# Patient Record
Sex: FEMALE | Race: WHITE | Employment: FULL TIME | URBAN - METROPOLITAN AREA
[De-identification: names, ages, dates, MRNs, and addresses within clinical notes are randomized per-mention and may not be internally consistent; named-entity substitution may affect disease eponyms.]

---

## 2017-03-20 RX ORDER — LEVONORGESTREL / ETHINYL ESTRADIOL AND ETHINYL ESTRADIOL 150-30(84)
KIT ORAL
Qty: 91 TABLET | Refills: 0 | Status: SHIPPED | OUTPATIENT
Start: 2017-03-20 | End: 2017-04-10 | Stop reason: SDUPTHER

## 2017-04-10 ENCOUNTER — OFFICE VISIT (OUTPATIENT)
Dept: OBGYN CLINIC | Age: 22
End: 2017-04-10

## 2017-04-10 VITALS
DIASTOLIC BLOOD PRESSURE: 80 MMHG | WEIGHT: 153 LBS | TEMPERATURE: 98.6 F | HEIGHT: 68 IN | SYSTOLIC BLOOD PRESSURE: 120 MMHG | BODY MASS INDEX: 23.19 KG/M2 | HEART RATE: 79 BPM

## 2017-04-10 DIAGNOSIS — Z79.3 ON ORAL CONTRACEPTIVE PILLS FOR NON-CONTRACEPTION INDICATION: ICD-10-CM

## 2017-04-10 DIAGNOSIS — Z01.419 WOMEN'S ANNUAL ROUTINE GYNECOLOGICAL EXAMINATION: Primary | ICD-10-CM

## 2017-04-10 DIAGNOSIS — Z87.42 HISTORY OF OVARIAN CYST: ICD-10-CM

## 2017-04-10 PROCEDURE — 99395 PREV VISIT EST AGE 18-39: CPT | Performed by: OBSTETRICS & GYNECOLOGY

## 2017-04-10 RX ORDER — LEVONORGESTREL / ETHINYL ESTRADIOL AND ETHINYL ESTRADIOL 150-30(84)
KIT ORAL
Qty: 91 TABLET | Refills: 3 | Status: SHIPPED | OUTPATIENT
Start: 2017-04-10 | End: 2019-05-06

## 2017-04-15 PROBLEM — Z79.3 ON ORAL CONTRACEPTIVE PILLS FOR NON-CONTRACEPTION INDICATION: Status: ACTIVE | Noted: 2017-04-15

## 2017-04-15 ASSESSMENT — ENCOUNTER SYMPTOMS
ABDOMINAL PAIN: 0
VOMITING: 0
NAUSEA: 0
CONSTIPATION: 0
ABDOMINAL DISTENTION: 0
DIARRHEA: 0
SHORTNESS OF BREATH: 0

## 2018-12-26 ENCOUNTER — OFFICE VISIT (OUTPATIENT)
Dept: ORTHOPEDIC SURGERY | Age: 23
End: 2018-12-26
Payer: COMMERCIAL

## 2018-12-26 VITALS
HEIGHT: 67 IN | DIASTOLIC BLOOD PRESSURE: 81 MMHG | BODY MASS INDEX: 24.33 KG/M2 | SYSTOLIC BLOOD PRESSURE: 124 MMHG | WEIGHT: 155 LBS | HEART RATE: 75 BPM

## 2018-12-26 DIAGNOSIS — M54.50 CHRONIC LUMBOSACRAL PAIN: ICD-10-CM

## 2018-12-26 DIAGNOSIS — M54.50 LUMBAR SPINE PAIN: ICD-10-CM

## 2018-12-26 DIAGNOSIS — S33.9XXA SPRAIN OF LIGAMENT OF LUMBOSACRAL JOINT, INITIAL ENCOUNTER: Primary | ICD-10-CM

## 2018-12-26 DIAGNOSIS — G89.29 CHRONIC LUMBOSACRAL PAIN: ICD-10-CM

## 2018-12-26 PROCEDURE — 99203 OFFICE O/P NEW LOW 30 MIN: CPT | Performed by: PHYSICIAN ASSISTANT

## 2018-12-26 RX ORDER — METHYLPREDNISOLONE 4 MG/1
TABLET ORAL
Qty: 1 KIT | Refills: 0 | Status: ON HOLD | OUTPATIENT
Start: 2018-12-26 | End: 2019-01-21 | Stop reason: ALTCHOICE

## 2018-12-26 RX ORDER — DICLOFENAC SODIUM 75 MG/1
75 TABLET, DELAYED RELEASE ORAL 2 TIMES DAILY
Qty: 60 TABLET | Refills: 1 | Status: SHIPPED
Start: 2018-12-26 | End: 2019-07-15 | Stop reason: ALTCHOICE

## 2019-01-10 ENCOUNTER — OFFICE VISIT (OUTPATIENT)
Dept: ORTHOPEDIC SURGERY | Age: 24
End: 2019-01-10
Payer: COMMERCIAL

## 2019-01-10 VITALS
DIASTOLIC BLOOD PRESSURE: 80 MMHG | HEART RATE: 75 BPM | SYSTOLIC BLOOD PRESSURE: 124 MMHG | HEIGHT: 67 IN | WEIGHT: 154.98 LBS | BODY MASS INDEX: 24.33 KG/M2

## 2019-01-10 DIAGNOSIS — M51.26 PROTRUSION OF LUMBAR INTERVERTEBRAL DISC: Primary | ICD-10-CM

## 2019-01-10 DIAGNOSIS — M54.16 LUMBAR RADICULITIS: ICD-10-CM

## 2019-01-10 PROCEDURE — 99213 OFFICE O/P EST LOW 20 MIN: CPT | Performed by: PHYSICIAN ASSISTANT

## 2019-01-18 ENCOUNTER — TELEPHONE (OUTPATIENT)
Dept: ORTHOPEDIC SURGERY | Age: 24
End: 2019-01-18

## 2019-01-21 ENCOUNTER — HOSPITAL ENCOUNTER (OUTPATIENT)
Age: 24
Setting detail: OUTPATIENT SURGERY
Discharge: HOME | End: 2019-01-21
Attending: PHYSICAL MEDICINE & REHABILITATION | Admitting: PHYSICAL MEDICINE & REHABILITATION
Payer: COMMERCIAL

## 2019-01-21 VITALS
SYSTOLIC BLOOD PRESSURE: 115 MMHG | TEMPERATURE: 97.5 F | DIASTOLIC BLOOD PRESSURE: 78 MMHG | HEART RATE: 51 BPM | BODY MASS INDEX: 24.33 KG/M2 | WEIGHT: 155 LBS | RESPIRATION RATE: 16 BRPM | OXYGEN SATURATION: 99 % | HEIGHT: 67 IN

## 2019-01-21 LAB — PREGNANCY, URINE: NEGATIVE

## 2019-01-21 PROCEDURE — 3600000002 HC SURGERY LEVEL 2 BASE: Performed by: PHYSICAL MEDICINE & REHABILITATION

## 2019-01-21 PROCEDURE — 7100000010 HC PHASE II RECOVERY - FIRST 15 MIN: Performed by: PHYSICAL MEDICINE & REHABILITATION

## 2019-01-21 PROCEDURE — 2709999900 HC NON-CHARGEABLE SUPPLY: Performed by: PHYSICAL MEDICINE & REHABILITATION

## 2019-01-21 PROCEDURE — 2500000003 HC RX 250 WO HCPCS: Performed by: PHYSICAL MEDICINE & REHABILITATION

## 2019-01-21 PROCEDURE — 6360000004 HC RX CONTRAST MEDICATION: Performed by: PHYSICAL MEDICINE & REHABILITATION

## 2019-01-21 PROCEDURE — 84703 CHORIONIC GONADOTROPIN ASSAY: CPT

## 2019-01-21 PROCEDURE — 6360000002 HC RX W HCPCS: Performed by: PHYSICAL MEDICINE & REHABILITATION

## 2019-01-21 RX ORDER — LIDOCAINE HYDROCHLORIDE 10 MG/ML
INJECTION, SOLUTION EPIDURAL; INFILTRATION; INTRACAUDAL; PERINEURAL PRN
Status: DISCONTINUED | OUTPATIENT
Start: 2019-01-21 | End: 2019-01-21 | Stop reason: HOSPADM

## 2019-01-21 ASSESSMENT — PAIN - FUNCTIONAL ASSESSMENT
PAIN_FUNCTIONAL_ASSESSMENT: PREVENTS OR INTERFERES WITH MANY ACTIVE NOT PASSIVE ACTIVITIES
PAIN_FUNCTIONAL_ASSESSMENT: 0-10

## 2019-01-21 ASSESSMENT — PAIN DESCRIPTION - DESCRIPTORS: DESCRIPTORS: ACHING;DULL

## 2019-02-15 ENCOUNTER — OFFICE VISIT (OUTPATIENT)
Dept: ORTHOPEDIC SURGERY | Age: 24
End: 2019-02-15
Payer: COMMERCIAL

## 2019-02-15 VITALS
BODY MASS INDEX: 24.33 KG/M2 | SYSTOLIC BLOOD PRESSURE: 124 MMHG | HEIGHT: 67 IN | DIASTOLIC BLOOD PRESSURE: 80 MMHG | HEART RATE: 77 BPM | WEIGHT: 154.98 LBS

## 2019-02-15 DIAGNOSIS — M54.16 LUMBAR RADICULITIS: ICD-10-CM

## 2019-02-15 DIAGNOSIS — M51.26 PROTRUSION OF LUMBAR INTERVERTEBRAL DISC: Primary | ICD-10-CM

## 2019-02-15 PROCEDURE — 99214 OFFICE O/P EST MOD 30 MIN: CPT | Performed by: PHYSICIAN ASSISTANT

## 2019-04-15 ENCOUNTER — TELEPHONE (OUTPATIENT)
Dept: OBGYN CLINIC | Age: 24
End: 2019-04-15

## 2019-04-15 ENCOUNTER — OFFICE VISIT (OUTPATIENT)
Dept: OBGYN CLINIC | Age: 24
End: 2019-04-15
Payer: COMMERCIAL

## 2019-04-15 VITALS
DIASTOLIC BLOOD PRESSURE: 72 MMHG | HEART RATE: 93 BPM | SYSTOLIC BLOOD PRESSURE: 116 MMHG | BODY MASS INDEX: 25.28 KG/M2 | WEIGHT: 166.8 LBS | TEMPERATURE: 98.5 F | HEIGHT: 68 IN

## 2019-04-15 DIAGNOSIS — Z87.42 HISTORY OF OVARIAN CYST: ICD-10-CM

## 2019-04-15 DIAGNOSIS — Z30.09 GENERAL COUNSELING AND ADVICE ON FEMALE CONTRACEPTION: Primary | ICD-10-CM

## 2019-04-15 DIAGNOSIS — Z79.3 ON ORAL CONTRACEPTIVE PILLS FOR NON-CONTRACEPTION INDICATION: ICD-10-CM

## 2019-04-15 PROCEDURE — 99213 OFFICE O/P EST LOW 20 MIN: CPT | Performed by: OBSTETRICS & GYNECOLOGY

## 2019-04-17 NOTE — TELEPHONE ENCOUNTER
Received benefits conformation fax. Nexplanon covered at 100%. Patient ok to move forward with order. Patient needs to re sign paperwork with full legal name in order to complete order. Patient aware that paperwork will be at . Coming by end of today or Friday.

## 2019-04-22 ENCOUNTER — OFFICE VISIT (OUTPATIENT)
Dept: ORTHOPEDIC SURGERY | Age: 24
End: 2019-04-22
Payer: COMMERCIAL

## 2019-04-22 ENCOUNTER — PRE-EVALUATION (OUTPATIENT)
Dept: ORTHOPEDIC SURGERY | Age: 24
End: 2019-04-22

## 2019-04-22 VITALS
HEART RATE: 79 BPM | HEIGHT: 68 IN | BODY MASS INDEX: 25.29 KG/M2 | WEIGHT: 166.89 LBS | DIASTOLIC BLOOD PRESSURE: 61 MMHG | SYSTOLIC BLOOD PRESSURE: 142 MMHG

## 2019-04-22 DIAGNOSIS — M51.26 HNP (HERNIATED NUCLEUS PULPOSUS), LUMBAR: ICD-10-CM

## 2019-04-22 DIAGNOSIS — M51.16 LUMBAR DISC HERNIATION WITH RADICULOPATHY: Primary | ICD-10-CM

## 2019-04-22 PROCEDURE — APPNB30 APP NON BILLABLE TIME 0-30 MINS: Performed by: PHYSICIAN ASSISTANT

## 2019-04-22 PROCEDURE — 99213 OFFICE O/P EST LOW 20 MIN: CPT | Performed by: ORTHOPAEDIC SURGERY

## 2019-04-22 ASSESSMENT — ENCOUNTER SYMPTOMS
VOMITING: 0
CONSTIPATION: 0
DIARRHEA: 0
ABDOMINAL DISTENTION: 0
ABDOMINAL PAIN: 0
NAUSEA: 0
SHORTNESS OF BREATH: 0

## 2019-04-23 NOTE — PROGRESS NOTES
Subjective:      Patient ID: Sherie Leon is a 26 y.o. female. HPI  27 y/o [de-identified] female presents to discuss OCP use and options for treatment of menstrual cycles--right lower quadrant pain. Patient is interested in switching from OCP to [facility]. Menses are every 3 months x 7 days, medium flow, occasional cramping. No sexual activity. No history of pap smear. Review of Systems   Constitutional: Negative. Negative for activity change, appetite change, chills, fatigue, fever and unexpected weight change. Respiratory: Negative for shortness of breath. Cardiovascular: Negative for chest pain. Gastrointestinal: Negative for abdominal distention, abdominal pain, constipation, diarrhea, nausea and vomiting. Genitourinary: Negative for difficulty urinating, dysuria, hematuria, menstrual problem, pelvic pain, vaginal bleeding, vaginal discharge and vaginal pain. Psychiatric/Behavioral: Negative. Objective:   Physical Exam   Constitutional: She is oriented to person, place, and time. She appears well-developed and well-nourished. No distress. Neurological: She is alert and oriented to person, place, and time. Skin: Skin is warm and dry. She is not diaphoretic. Psychiatric: She has a normal mood and affect. Her behavior is normal. Judgment and thought content normal.   Nursing note and vitals reviewed. Assessment:       Diagnosis Orders   1. General counseling and advice on female contraception     2. On oral contraceptive pills for non-contraception indication     3. History of ovarian cyst             Plan:     Approximately 15 minutes spent in counseling and coordination of care with over 50% in direct face to face counseling. Options for treatment of menstrual cycles reviewed. Risks and benefits discussed. Process for Nexplanon initiated. Follow up for Nexplanon placement. Follow up in 3-6 months for annual examination and follow up.          Blanca Arteaga, DO

## 2019-04-26 NOTE — TELEPHONE ENCOUNTER
informed pt we received Nexplanon in office. Pt to call on next day 1 of menses for insertion.     Added to med book and scanned papers

## 2019-04-28 NOTE — PROGRESS NOTES
Inform patient to schedule for a Fasting Physical with me if I'm still patient's PCP and patient wants to remain in our practice since not been seen by me for over 3 years.

## 2019-05-06 ENCOUNTER — OFFICE VISIT (OUTPATIENT)
Dept: OBGYN CLINIC | Age: 24
End: 2019-05-06
Payer: COMMERCIAL

## 2019-05-06 VITALS
BODY MASS INDEX: 24.73 KG/M2 | DIASTOLIC BLOOD PRESSURE: 68 MMHG | TEMPERATURE: 98.7 F | WEIGHT: 162.6 LBS | HEART RATE: 80 BPM | SYSTOLIC BLOOD PRESSURE: 110 MMHG

## 2019-05-06 DIAGNOSIS — Z32.02 URINE PREGNANCY TEST NEGATIVE: ICD-10-CM

## 2019-05-06 DIAGNOSIS — Z30.017 ENCOUNTER FOR INITIAL PRESCRIPTION OF IMPLANTABLE SUBDERMAL CONTRACEPTIVE: Primary | ICD-10-CM

## 2019-05-06 LAB
CONTROL: NORMAL
PREGNANCY TEST URINE, POC: NEGATIVE

## 2019-05-06 PROCEDURE — 81025 URINE PREGNANCY TEST: CPT | Performed by: OBSTETRICS & GYNECOLOGY

## 2019-05-06 PROCEDURE — 11981 INSERTION DRUG DLVR IMPLANT: CPT | Performed by: OBSTETRICS & GYNECOLOGY

## 2019-05-06 NOTE — PROGRESS NOTES
Nexplanon Contraceptive Implant   Insertion Note    The pt is a 26 y.o. Jordan Abrams who presents today for Insertion of a subdermal contraceptive implant. She has been counseled regarding the risks, benefits and alternatives of the procedure. She has signed the consent form, and wishes to proceed with insertion today. Current method of contraception: None    Desired future contraception: Nexplanon     Pregnancy test: negative      Procedure Details  The inner side of the Left arm was cleaned with Betadine and infiltrated with  1% lidocaine. The contraceptive tello was then inserted according to the 's instructions without complications. The tello was palpable under the skin after the insertion. The patient was instructed to palpate the implant. The insertion site was closed with steri-strips and coban dressing. The patient tolerated the procedure without complications.     Christy Jackman DO

## 2019-07-15 ENCOUNTER — OFFICE VISIT (OUTPATIENT)
Dept: OBGYN CLINIC | Age: 24
End: 2019-07-15
Payer: COMMERCIAL

## 2019-07-15 VITALS
TEMPERATURE: 98.4 F | HEART RATE: 55 BPM | BODY MASS INDEX: 25.25 KG/M2 | DIASTOLIC BLOOD PRESSURE: 64 MMHG | WEIGHT: 166 LBS | SYSTOLIC BLOOD PRESSURE: 112 MMHG

## 2019-07-15 DIAGNOSIS — Z30.011 ENCOUNTER FOR INITIAL PRESCRIPTION OF CONTRACEPTIVE PILLS: ICD-10-CM

## 2019-07-15 DIAGNOSIS — R10.2 PELVIC PAIN: ICD-10-CM

## 2019-07-15 DIAGNOSIS — Z30.46 ENCOUNTER FOR NEXPLANON REMOVAL: Primary | ICD-10-CM

## 2019-07-15 PROCEDURE — 99999 PR OFFICE/OUTPT VISIT,PROCEDURE ONLY: CPT | Performed by: OBSTETRICS & GYNECOLOGY

## 2019-07-15 PROCEDURE — 11982 REMOVE DRUG IMPLANT DEVICE: CPT | Performed by: OBSTETRICS & GYNECOLOGY

## 2019-07-15 RX ORDER — LEVONORGESTREL / ETHINYL ESTRADIOL AND ETHINYL ESTRADIOL 150-30(84)
1 KIT ORAL DAILY
Qty: 91 TABLET | Refills: 3 | Status: SHIPPED | OUTPATIENT
Start: 2019-07-15 | End: 2020-07-21

## 2019-07-15 NOTE — PROGRESS NOTES
Nexplanon Contraceptive Implant Removal Note    The pt is a 26 y.o. [place] Monet who presents today for removal of a subdermal contraceptive implant. She has been counseled regarding the risks, benefits and alternatives of the procedure. She has signed the consent form, and wishes to proceed with removal today. Current method of contraception: Nexplanon    Desired future contraception: undecided     Procedure Details  The inner side of the arm was cleaned with Betadine and infiltrated with 1% lidocaine. A scalpel was used to create a 1 mm incision along the distal aspect of the implant. A hemostat was used to remove the implant. Incision was then re approximated with steri-strips and coban dressing. The patient tolerated the procedure without complications.     Sanjana Ascencio,

## 2019-07-15 NOTE — PROGRESS NOTES
[facility]   Return Gyn Office Visit    CC:   Chief Complaint   Patient presents with   [facility] Other     possible wants nexplanon removed dur to making menses and cramps worse. HPI:   26 y.o. who [facility]iscuss Nex[facility]on Removal, Pt of Dr Joey Hammond. Pain on RLQ, persistent. Worsened with Nexplanon. Pain present for a couple years. OCPs were helpful, took pain down to an ache. Describes \"punching pain\" in abdomen. Last US done in November, within normal limits. States pain improves laying supine. Denies vaginal discharge / fevers / chills. Denies recent pelvic infection. Denies nausea / emesis / diarrhea or constipation. Does not admit to bowel changes affecting her pain. No visit scheduled with Dr. Joey Hammond. Review of Systems - The following ROS was otherwise negative, except as noted in the HPI: constitutional, respiratory, cardiovascular, gastrointestinal, genitourinary    Objective:  /64 (Site: Left Upper Arm, Position: Sitting, Cuff Size: Medium Adult)   Pulse 55   Temp 98.4 °F (36.9 °C) (Oral)   Wt 166 lb (75.3 kg)   LMP 07/01/2019   Breastfeeding? No   BMI 25.25 kg/m²   General: Alert, well appearing, no acute distress   Resp effort within normal limits   Abdomen: Soft, nontender, nondistended   Pelvic: deferred   Skin: No visible lesions / rashes / concerning nevus, Nexplanon in place in arm    Extremities: No redness or tenderness, neg Miguelina's sign  Osteopathic: no TART changes    Assessment/Plan  1. Encounter for Nexplanon removal    2. Encounter for initial prescription of contraceptive pills  - Levonorgest-Eth Estrad 91-Day 0.15-0.03 &0.01 MG TABS; Take 1 tablet by mouth daily  Dispense: 91 tablet; Refill: 3    3. Pelvic pain      Follow Up   Return if symptoms worsen or fail to improve.     Merlin Mejia,

## 2020-02-17 ENCOUNTER — OFFICE VISIT (OUTPATIENT)
Dept: ORTHOPEDIC SURGERY | Age: 25
End: 2020-02-17
Payer: COMMERCIAL

## 2020-02-17 VITALS — HEIGHT: 68 IN | WEIGHT: 166.01 LBS | BODY MASS INDEX: 25.16 KG/M2

## 2020-02-17 PROCEDURE — 99214 OFFICE O/P EST MOD 30 MIN: CPT | Performed by: ORTHOPAEDIC SURGERY

## 2020-02-17 RX ORDER — DICLOFENAC SODIUM 75 MG/1
75 TABLET, DELAYED RELEASE ORAL 2 TIMES DAILY
Qty: 60 TABLET | Refills: 1 | Status: SHIPPED | OUTPATIENT
Start: 2020-02-17 | End: 2020-09-22

## 2020-02-27 ENCOUNTER — HOSPITAL ENCOUNTER (OUTPATIENT)
Dept: PHYSICAL THERAPY | Age: 25
Setting detail: THERAPIES SERIES
Discharge: HOME | End: 2020-02-27
Payer: COMMERCIAL

## 2020-02-27 PROCEDURE — 97032 APPL MODALITY 1+ESTIM EA 15: CPT

## 2020-02-27 PROCEDURE — 97161 PT EVAL LOW COMPLEX 20 MIN: CPT

## 2020-02-27 PROCEDURE — 97110 THERAPEUTIC EXERCISES: CPT

## 2020-02-27 NOTE — FLOWSHEET NOTE
Glut sets 5\"x15  HEP   TA isos 5\"x15  HEP   Prone quad stretch 30\"x3  HEP                                 Pt education on current condition, POC, expectations for therapy, and HEP 5'                 Manual Intervention (57067)                                          NMR re-education (71919)   CUES NEEDED                                                         Therapeutic Activity (94850)                                                Therapeutic Exercise and NMR EXR  [x] (52195) Provided verbal/tactile cueing for activities related to strengthening, flexibility, endurance, ROM for improvements in LE, proximal hip, and core control with self care, mobility, lifting, ambulation.  [] (79379) Provided verbal/tactile cueing for activities related to improving balance, coordination, kinesthetic sense, posture, motor skill, proprioception  to assist with LE, proximal hip, and core control in self care, mobility, lifting, ambulation and eccentric single leg control.      NMR and Therapeutic Activities:    [] (55261 or 56255) Provided verbal/tactile cueing for activities related to improving balance, coordination, kinesthetic sense, posture, motor skill, proprioception and motor activation to allow for proper function of core, proximal hip and LE with self care and ADLs  [] (60571) Gait Re-education- Provided training and instruction to the patient for proper LE, core and proximal hip recruitment and positioning and eccentric body weight control with ambulation re-education including up and down stairs     Home Exercise Program:    [x] (57406) Reviewed/Progressed HEP activities related to strengthening, flexibility, endurance, ROM of core, proximal hip and LE for functional self-care, mobility, lifting and ambulation/stair navigation   [] (31281)Reviewed/Progressed HEP activities related to improving balance, coordination, kinesthetic sense, posture, motor skill, proprioception of core, proximal hip and LE for self care, indicated by patients Functional Deficits. []? Progressing: []? Met: []? Not Met: []? Adjusted  3. Patient will demonstrate an increase in Strength to good proximal hip strength and control, 5/5 R LE to allow for proper functional mobility as indicated by patients Functional Deficits. []? Progressing: []? Met: []? Not Met: []? Adjusted  4. Patient will return to all functional activities without increased symptoms or restriction. []? Progressing: []? Met: []? Not Met: []? Adjusted  5. Patient will be able to squat without R hip pain(patient specific functional goal)    []? Progressing: []? Met: []? Not Met: []? Adjusted            Progression Towards Functional goals:  [] Patient is progressing as expected towards functional goals listed. [] Progression is slowed due to complexities listed. [] Progression has been slowed due to co-morbidities. [x] Plan just implemented, too soon to assess goals progression  [] Other:         Overall Progression Towards Functional goals/ Treatment Progress Update:  [] Patient is progressing as expected towards functional goals listed. [] Progression is slowed due to complexities/Impairments listed. [] Progression has been slowed due to co-morbidities.   [x] Plan just implemented, too soon to assess goals progression <30days   [] Goals require adjustment due to lack of progress  [] Patient is not progressing as expected and requires additional follow up with physician  [] Other    Prognosis for POC: [x] Good [] Fair  [] Poor      Patient requires continued skilled intervention: [x] Yes  [] No    Treatment/Activity Tolerance:  [x] Patient able to complete treatment  [] Patient limited by fatigue  [] Patient limited by pain    [] Patient limited by other medical complications  [] Other:     ASSESSMENT: See eval. Patient was educated on current condition, POC, expectations for therapy, and HEP    Return to Play: (if applicable)   []  Stage 1: Intro to Strength   []  Stage 2: Return to Run and Strength   []  Stage 3: Return to Jump and Strength   []  Stage 4: Dynamic Strength and Agility   []  Stage 5: Sport Specific Training     []  Ready to Return to Play, Meets All Above Stages   []  Not Ready for Return to Sports   Comments:                               PLAN: See eval  [] Continue per plan of care [] Alter current plan (see comments above)  [x] Plan of care initiated [] Hold pending MD visit [] Discharge      Electronically signed by:  Olena Padron, PT, DPT [id number]    Note: If patient does not return for scheduled/ recommended follow up visits, this note will serve as a discharge from care along with most recent update on progress.

## 2020-02-27 NOTE — PLAN OF CARE
[facility], [address]  Phone: [phone]  Fax [phone]     Physical Therapy Certification    Dear Referring Practitioner: Garfield Casanova MD,    We had the pleasure of evaluating the following patient for physical therapy services at [address] [place] [address]. A summary of our findings can be found in the initial assessment below. This includes our plan of care. If you have any questions or concerns regarding these findings, please do not hesitate to contact me at the office phone number checked above. Thank you for the referral.       Physician Signature:_______________________________Date:__________________  By signing above (or electronic signature), therapists plan is approved by physician    Patient: Toñito Merino   : 1995   MRN: [id number]  Referring Physician: Referring Practitioner: Garfield Casanova MD      Evaluation Date: 2020      Medical Diagnosis Information:  Diagnosis: S76.011A Strain of the R hip flexor; M70.61 Greater trochanteric bursitis R hip   Treatment Diagnosis: M25.551 R hip pain                                         Insurance information: PT Insurance Information: BCBS        Precautions/ Contra-indications: None  Latex Allergy:  [x]NO      []YES  Preferred Language for Healthcare:   [x]English       []other:    SUBJECTIVE: Patient stated complaint: Patient reports R hip pain that has been intermittent over the last month and has been worse with prolonged sitting, squatting or standing. She went to see Dr. FLORES [facility] who diagnosed her with a R hip flexor strain and greater trochanteric bursitis. She was prescribed a steroid which she has been taking and notes it helps.       Relevant Medical History: LBP  Functional Disability Index: LEFS: 44/80    Height5'7\" Weight 162 lbs   Pain Scale: 2-6/10  Easing factors: rest, ice/heat  Provocative factors: squatting, (M05.9)  []Osteoarthritis (M19.91)   Cardiovascular conditions   []Hypertension (I10)  []Hyperlipidemia (E78.5)  []Angina pectoris (I20)  []Atherosclerosis (I70)   Musculoskeletal conditions   []Disc pathology   []Congenital spine pathologies   []Prior surgical intervention  []Osteoporosis (M81.8)  []Osteopenia (M85.8)   Endocrine conditions   []Hypothyroid (E03.9)  []Hyperthyroid Gastrointestinal conditions   []Constipation (M17.10)   Metabolic conditions   []Morbid obesity (E66.01)  []Diabetes type 1(E10.65) or 2 (E11.65)   []Neuropathy (G60.9)     Pulmonary conditions   []Asthma (J45)  []Coughing   []COPD (J44.9)   Psychological Disorders  []Anxiety (F41.9)  []Depression (F32.9)   []Other:   []Other:          Barriers to/and or personal factors that will affect rehab potential:              []Age  []Sex              []Motivation/Lack of Motivation                        []Co-Morbidities              []Cognitive Function, education/learning barriers              [x]Environmental, home barriers              [x]profession/work barriers  []past PT/medical experience  []other:  Justification: Patient is expected to rehab well with skilled PT    Falls Risk Assessment (30 days):   [x] Falls Risk assessed and no intervention required.   [] Falls Risk assessed and Patient requires intervention due to being higher risk   TUG score (>12s at risk):     [] Falls education provided, including         ASSESSMENT: Patient presents with s/s consistent with R hip flexor strain  Functional Impairments:     []Noted lumbar/proximal hip/LE joint hypomobility   [x]Decreased LE functional ROM   [x]Decreased core/proximal hip strength and neuromuscular control   [x]Decreased LE functional strength   [x]Reduced balance/proprioceptive control   []other:      Functional Activity Limitations (from functional questionnaire and intake)   [x]Reduced ability to tolerate prolonged functional positions   [x]Reduced ability or difficulty with examination of body systems using standardized tests and measures addressing any of the following: body structures and functions (impairments), activity limitations, and/or participation restrictions;:  [x] a total of 1-2 or more elements   [] a total of 3 or more elements   [] a total of 4 or more elements   [x] A clinical presentation with:  [x] stable and/or uncomplicated characteristics   [] evolving clinical presentation with changing characteristics  [] unstable and unpredictable characteristics;   [x] Clinical decision making of [x] low, [] moderate, [] high complexity using standardized patient assessment instrument and/or measurable assessment of functional outcome. [x] EVAL (LOW) 59605 (typically 20 minutes face-to-face)  [] EVAL (MOD) 14452 (typically 30 minutes face-to-face)  [] EVAL (HIGH) 39691 (typically 45 minutes face-to-face)  [] RE-EVAL       PLAN:   Frequency/Duration:  1-2 days per week for 4  Weeks:  Interventions:  [x]  Therapeutic exercise including: strength training, ROM, for Lower extremity and core   [x]  NMR activation and proprioception for LE, Glutes and Core   [x]  Manual therapy as indicated for LE, Hip and spine to include: Dry Needling/IASTM, STM, PROM, Gr I-IV mobilizations, manipulation. [x] Modalities as needed that may include: thermal agents, E-stim, Biofeedback, US, iontophoresis as indicated  [x] Patient education on joint protection, postural re-education, activity modification, progression of HEP. HEP instruction: (see scanned forms)    GOALS:  Patient stated goal: To be able to do her job without R hip pain  [] Progressing: [] Met: [] Not Met: [] Adjusted    Therapist goals for Patient:   Short Term Goals: To be achieved in: 2 weeks  1. Independent in HEP and progression per patient tolerance, in order to prevent re-injury. [] Progressing: [] Met: [] Not Met: [] Adjusted  2.  Patient will have a decrease in pain to facilitate improvement in movement, function,

## 2020-03-02 ENCOUNTER — HOSPITAL ENCOUNTER (OUTPATIENT)
Dept: PHYSICAL THERAPY | Age: 25
Setting detail: THERAPIES SERIES
Discharge: HOME | End: 2020-03-02
Payer: COMMERCIAL

## 2020-03-02 PROCEDURE — 97140 MANUAL THERAPY 1/> REGIONS: CPT

## 2020-03-02 PROCEDURE — 97110 THERAPEUTIC EXERCISES: CPT

## 2020-03-02 PROCEDURE — 97032 APPL MODALITY 1+ESTIM EA 15: CPT

## 2020-03-02 NOTE — FLOWSHEET NOTE
[facility], [address]  Phone: [phone]  Fax [phone]    Physical Therapy Treatment Note/ Progress Report:           Date:  3/2/2020    Patient Name:  Manju Benavidez    :  1995  MRN: [id number]  Restrictions/Precautions:    Medical/Treatment Diagnosis Information:  · Diagnosis: S76.011A Strain of the R hip flexor; M70.61 Greater trochanteric bursitis R hip  · Treatment Diagnosis: M25.551 R hip pain  Insurance/Certification information:  PT Insurance Information: Lashell [address] 150   Physician Information:  Referring Practitioner: Jojo Lauren MD  Has the plan of care been signed (Y/N):        []  Yes  [x]  No     Date of Patient follow up with Physician: Not yet scheduled      Is this a Progress Report:     []  Yes  [x]  No        If Yes:  Date Range for reporting period:  Beginning2020  Ending    Progress report will be due (10 Rx or 30 days whichever is less):       Recertification will be due (POC Duration  / 90 days whichever is less): 2020      Visit # Insurance Allowable Requires auth   2 75    [x]no        []yes:     Functional Scale: LEFS: 44/80    Date assessed:  2020      Therapy Diagnosis/Practice Pattern:D      Number of Comorbidities:  [x]0     []1-2    []3+    Latex Allergy:  [x]NO      []YES  Preferred Language for Healthcare:   [x]English       []other:      Pain level:  2/10     SUBJECTIVE:  Patient reports mild improvement since last appt with greater ease of performing work-related activities. She notes that the e-stim last session helped reduce her pain a lot.      OBJECTIVE: See eval   Observation:    Test measurements:         PT Practice Pattern:   Co morbidities:   Nonsurgical   INITIAL VISIT  CURRENT VISIT   PAIN       FUNCTIONAL SCALE       ROM                                               STRENGHT (MMT) RESTRICTIONS/PRECAUTIONS: None    Exercises/Interventions:     Jorge: [id number]      Therapeutic Ex (16023) Sets/sec Reps Notes/CUES   5\"x15  HEP   5\"x15  HEP   TA isos 5\"x15  HEP   Prone quad stretch 30\"x3  HEP   Bike warm up 5'     Standing HS stretch 30\"x3     Anibal test stretch  30\"x3     Supine clamshells OVL 5\"H, 2x15      Bridges add/abd OVL 3\"H, 2x15 ea      Hip flexor isometric 5\"H, 15x      TA +kegel + hip add sequential contraction 15x                              Pt education on current condition, POC, expectations for therapy, and HEP                  Manual Intervention (42674)      Hip flexor release, SL hip flexor stretch 8'                                   NMR re-education (81024)   CUES NEEDED                                                         Therapeutic Activity (78862)                                                Therapeutic Exercise and NMR EXR  [x] (46509) Provided verbal/tactile cueing for activities related to strengthening, flexibility, endurance, ROM for improvements in LE, proximal hip, and core control with self care, mobility, lifting, ambulation.  [] (27162) Provided verbal/tactile cueing for activities related to improving balance, coordination, kinesthetic sense, posture, motor skill, proprioception  to assist with LE, proximal hip, and core control in self care, mobility, lifting, ambulation and eccentric single leg control.      NMR and Therapeutic Activities:    [] (83270 or 97253) Provided verbal/tactile cueing for activities related to improving balance, coordination, kinesthetic sense, posture, motor skill, proprioception and motor activation to allow for proper function of core, proximal hip and LE with self care and ADLs  [] (25596) Gait Re-education- Provided training and instruction to the patient for proper LE, core and proximal hip recruitment and positioning and eccentric body weight control with ambulation re-education including up and down stairs improvement in movement, function, and ADLs as indicated by Functional Deficits. []? Progressing: []? Met: []? Not Met: []? Adjusted     Long Term Goals: To be achieved in: 4 weeks  1. Disability index score of 22% or less for the LEFS to assist with reaching prior level of function. []? Progressing: []? Met: []? Not Met: []? Adjusted  2. Patient will demonstrate pain free AROM to to allow for proper joint functioning as indicated by patients Functional Deficits. []? Progressing: []? Met: []? Not Met: []? Adjusted  3. Patient will demonstrate an increase in Strength to good proximal hip strength and control, 5/5 R LE to allow for proper functional mobility as indicated by patients Functional Deficits. []? Progressing: []? Met: []? Not Met: []? Adjusted  4. Patient will return to all functional activities without increased symptoms or restriction. []? Progressing: []? Met: []? Not Met: []? Adjusted  5. Patient will be able to squat without R hip pain(patient specific functional goal)    []? Progressing: []? Met: []? Not Met: []? Adjusted            Progression Towards Functional goals:  [] Patient is progressing as expected towards functional goals listed. [] Progression is slowed due to complexities listed. [] Progression has been slowed due to co-morbidities. [x] Plan just implemented, too soon to assess goals progression  [] Other:         Overall Progression Towards Functional goals/ Treatment Progress Update:  [] Patient is progressing as expected towards functional goals listed. [] Progression is slowed due to complexities/Impairments listed. [] Progression has been slowed due to co-morbidities.   [x] Plan just implemented, too soon to assess goals progression <30days   [] Goals require adjustment due to lack of progress  [] Patient is not progressing as expected and requires additional follow up with physician  [] Other    Prognosis for POC: [x] Good [] Fair  [] Poor      Patient requires continued skilled intervention: [x] Yes  [] No    Treatment/Activity Tolerance:  [x] Patient able to complete treatment  [] Patient limited by fatigue  [] Patient limited by pain    [] Patient limited by other medical complications  [] Other:     ASSESSMENT: Proximal hip and core strengthening were progressed this session. Patient has pain with active hip flexion and palpation of her proximal RF near insertion however she was able to tolerate R hip flexor isometrics without increase in pain. All other ex were well tolerated and pain free. Return to Play: (if applicable)   []  Stage 1: Intro to Strength   []  Stage 2: Return to Run and Strength   []  Stage 3: Return to Jump and Strength   []  Stage 4: Dynamic Strength and Agility   []  Stage 5: Sport Specific Training     []  Ready to Return to Play, Meets All Above Stages   []  Not Ready for Return to Sports   Comments:                               PLAN: Continue proximal hip and core strengthening as pt tolerates. [x] Continue per plan of care [] Alter current plan (see comments above)  [] Plan of care initiated [] Hold pending MD visit [] Discharge      Electronically signed by:  Dimitri Núñez PT, DPT [id number]    Note: If patient does not return for scheduled/ recommended follow up visits, this note will serve as a discharge from care along with most recent update on progress.

## 2020-03-05 ENCOUNTER — HOSPITAL ENCOUNTER (OUTPATIENT)
Dept: PHYSICAL THERAPY | Age: 25
Setting detail: THERAPIES SERIES
Discharge: HOME | End: 2020-03-05
Payer: COMMERCIAL

## 2020-03-05 PROCEDURE — 97140 MANUAL THERAPY 1/> REGIONS: CPT

## 2020-03-05 PROCEDURE — 97110 THERAPEUTIC EXERCISES: CPT

## 2020-03-05 PROCEDURE — 97032 APPL MODALITY 1+ESTIM EA 15: CPT

## 2020-03-05 NOTE — FLOWSHEET NOTE
None    Exercises/Interventions:     [facility]: [id number]      Therapeutic Ex (49337) Sets/sec Reps Notes/CUES   5\"x15  HEP   5\"x15  HEP   5\"x15  HEP   30\"x3  HEP   Bike warm up 5'     Trustretch HS stretch 30\"x3     Anibal test stretch  30\"x3      5\"H, 2x15      Bridges add/abd OVL 3\"H, 2x15 ea      Hip flexor isometric 5\"H, 15x      TA +kegel + hip add sequential contraction 15x      Buckeyes  2x10     Supine marches OVL 15x 3\"H     SLS latt pull down+ opposite hip flexion 2x10 ea     LBW GVL 15'x4     Pt education on current condition, POC, expectations for therapy, and HEP                  Manual Intervention (40954)      Hip flexor release, SL hip flexor stretch 8'     Massage stick R RF, VL,                               NMR re-education (07435)   CUES NEEDED                                                         Therapeutic Activity (05525)                                                Therapeutic Exercise and NMR EXR  [x] (84336) Provided verbal/tactile cueing for activities related to strengthening, flexibility, endurance, ROM for improvements in LE, proximal hip, and core control with self care, mobility, lifting, ambulation.  [] (83858) Provided verbal/tactile cueing for activities related to improving balance, coordination, kinesthetic sense, posture, motor skill, proprioception  to assist with LE, proximal hip, and core control in self care, mobility, lifting, ambulation and eccentric single leg control.      NMR and Therapeutic Activities:    [] (11449 or 42816) Provided verbal/tactile cueing for activities related to improving balance, coordination, kinesthetic sense, posture, motor skill, proprioception and motor activation to allow for proper function of core, proximal hip and LE with self care and ADLs  [] (22779) Gait Re-education- Provided training and instruction to the patient for proper LE, core and proximal hip recruitment and positioning and eccentric body weight control with ambulation re-education including up and down stairs     Home Exercise Program:    [x] (89800) Reviewed/Progressed HEP activities related to strengthening, flexibility, endurance, ROM of core, proximal hip and LE for functional self-care, mobility, lifting and ambulation/stair navigation   [] (34446)Reviewed/Progressed HEP activities related to improving balance, coordination, kinesthetic sense, posture, motor skill, proprioception of core, proximal hip and LE for self care, mobility, lifting, and ambulation/stair navigation      Manual Treatments:  PROM / STM / Oscillations-Mobs:  G-I, II, III, IV (PA's, Inf., Post.)  [] (07353) Provided manual therapy to mobilize LE, proximal hip and/or LS spine soft tissue/joints for the purpose of modulating pain, promoting relaxation,  increasing ROM, reducing/eliminating soft tissue swelling/inflammation/restriction, improving soft tissue extensibility and allowing for proper ROM for normal function with self care, mobility, lifting and ambulation. Modalities:  HP + PM x 15'   [] GAME READY (VASO)- for significant edema, swelling, pain control. Charges:  Timed Code Treatment Minutes: 45'    Total Treatment Minutes: 79'          [] EVAL (LOW) 51277 (typically 20 minutes face-to-face)  [] EVAL (MOD) 53450 (typically 30 minutes face-to-face)  [] EVAL (HIGH) 21160 (typically 45 minutes face-to-face)  [] RE-EVAL     [x] UE(97924) x 2    [] IONTO  [] NMR (25527) x     [] VASO  [x] Manual (85612) x 1     [] Other:  [] TA x      [] Mech Traction (57933)  [] ES(attended) (74892)      [x] ES (un) (16096): HP+PM x15'      GOALS:   Patient stated goal: To be able to do her job without R hip pain  []? Progressing: []? Met: []? Not Met: []? Adjusted     Therapist goals for Patient:   Short Term Goals: To be achieved in: 2 weeks  1. Independent in HEP and progression per patient tolerance, in order to prevent re-injury. []? Progressing: []? Met: []? Not Met: []? Adjusted  2.  Patient will have Poor      Patient requires continued skilled intervention: [x] Yes  [] No    Treatment/Activity Tolerance:  [x] Patient able to complete treatment  [] Patient limited by fatigue  [] Patient limited by pain    [] Patient limited by other medical complications  [] Other:     ASSESSMENT: Patient had pain free hip flexion this session with addition of light resistance well tolerated. WB exercises were incorporated as well with focus on core and proximal hip. Patient was able to perform all pain free and given updated HEP. Return to Play: (if applicable)   []  Stage 1: Intro to Strength   []  Stage 2: Return to Run and Strength   []  Stage 3: Return to Jump and Strength   []  Stage 4: Dynamic Strength and Agility   []  Stage 5: Sport Specific Training     []  Ready to Return to Play, Meets All Above Stages   []  Not Ready for Return to Sports   Comments:                               PLAN: Continue proximal hip and core strengthening as pt tolerates. [x] Continue per plan of care [] Alter current plan (see comments above)  [] Plan of care initiated [] Hold pending MD visit [] Discharge      Electronically signed by:  Gela Mcgee PT, DPT [id number]    Note: If patient does not return for scheduled/ recommended follow up visits, this note will serve as a discharge from care along with most recent update on progress.

## 2020-03-09 ENCOUNTER — HOSPITAL ENCOUNTER (OUTPATIENT)
Dept: PHYSICAL THERAPY | Age: 25
Setting detail: THERAPIES SERIES
Discharge: HOME | End: 2020-03-09
Payer: COMMERCIAL

## 2020-03-09 PROCEDURE — 97110 THERAPEUTIC EXERCISES: CPT | Performed by: PHYSICAL THERAPIST

## 2020-03-09 PROCEDURE — 97140 MANUAL THERAPY 1/> REGIONS: CPT | Performed by: PHYSICAL THERAPIST

## 2020-03-09 PROCEDURE — G0283 ELEC STIM OTHER THAN WOUND: HCPCS | Performed by: PHYSICAL THERAPIST

## 2020-03-09 NOTE — FLOWSHEET NOTE
[facility], [address] [place]  Phone: [phone]  Fax [phone]      ATHLETIC TRAINING [address]  Date:  3/9/2020    Patient Name:  Merari Pritchard    :  1995  MRN: [id number]  Restrictions/Precautions:    Medical/Treatment Diagnosis Information:  ·   Diagnosis: S76.011A Strain of the R hip flexor; M70.61 Greater trochanteric bursitis R hip  · Treatment Diagnosis: M25.551 R hip pain  Physician Information:    You Jaimes MD    Weeks Post-op  8 wks  12 wks 16 wks 20 wks   24 wks                            Activity Log                                                  DOS/DOI:                                                    Date:  20   ATC communication    Bike    Elliptical    Treadmill    Airdyne        Gastroc stretch    Soleus stretch    Hamstring stretch    ITB stretch    Hip Flexor stretch    Quad stretch    Adductor stretch        Weight Shifting sp                              fp                              tp    Lateral walking (with/w/o TB)        Balance: PEP/Rosi board                   SLS          Star excursion load/explode          Extremity reach UE/LE        Leg Press Marlon. Ecc.                      Inv. Calf Press Marlon. Ecc.                        Inv.        ISTAU   Flex               ABd 45# R/L 30x              ADd Red Band Ecc Walkout R/L 12x             TKE 75# 30x5\" /march              Ext 60# R/L 30x       Steps Up               Up and Over               Down               Lateral               Rotation        Squats  mini                  wall                 BOSU         Lunges:  Lunge to Balance                   Balance to Lunge                   Walking        Knee Extension Bilat. Ecc.                               Inv. Hamstring Curls Bilat.

## 2020-03-09 NOTE — FLOWSHEET NOTE
RESTRICTIONS/PRECAUTIONS: None    Exercises/Interventions:     [facility]: [id number]      Therapeutic Ex (11569) (23') Sets/sec Reps Notes/CUES   5\"x15  HEP   5\"x15  HEP   5\"x15  HEP   30\"x3  HEP   Bike warm up 5' L2 post mobs and manuals prior to therex     Trustretch HS stretch 3D  30\"x3     Anibal test stretch  30\"x3      5\"H, 2x15      Bridges abd OVL 3\"H, 2x15 ea              Buckeyes RLE only EXT/L active FLX 2x10x5\"                 HL ADD sets 10x10\" withTA           Open book stretch with ADD ball at knees R/L 10x3\" ea                 Pt education x6'  Process, mechanics, post/ant hip relationship   AT See AT note  B TKE/HAB/Glute         Manual Intervention (72060) (15')      Hip flexor release (RF release, iliacus release), prone sacral base mobs GIII, glute and TFl release RLE, prone hip PROM and quad stretch 12'           prone HF stretch R/LLE 3'                       NMR re-education (19739)   CUES NEEDED                                                         Therapeutic Activity (04153)                                                Therapeutic Exercise and NMR EXR  [x] (58889) Provided verbal/tactile cueing for activities related to strengthening, flexibility, endurance, ROM for improvements in LE, proximal hip, and core control with self care, mobility, lifting, ambulation.  [] (58957) Provided verbal/tactile cueing for activities related to improving balance, coordination, kinesthetic sense, posture, motor skill, proprioception  to assist with LE, proximal hip, and core control in self care, mobility, lifting, ambulation and eccentric single leg control.      NMR and Therapeutic Activities:    [] (59664 or 57554) Provided verbal/tactile cueing for activities related to improving balance, coordination, kinesthetic sense, posture, motor skill, proprioception and motor activation to allow for proper function of core, proximal hip and LE with self care and ADLs  [] (57536) Gait Re-education- Provided training and instruction to the patient for proper LE, core and proximal hip recruitment and positioning and eccentric body weight control with ambulation re-education including up and down stairs     Home Exercise Program:    [x] (66691) Reviewed/Progressed HEP activities related to strengthening, flexibility, endurance, ROM of core, proximal hip and LE for functional self-care, mobility, lifting and ambulation/stair navigation   [] (44995)Reviewed/Progressed HEP activities related to improving balance, coordination, kinesthetic sense, posture, motor skill, proprioception of core, proximal hip and LE for self care, mobility, lifting, and ambulation/stair navigation      Manual Treatments:  PROM / STM / Oscillations-Mobs:  G-I, II, III, IV (PA's, Inf., Post.)  [x] (94365) Provided manual therapy to mobilize LE, proximal hip and/or LS spine soft tissue/joints for the purpose of modulating pain, promoting relaxation,  increasing ROM, reducing/eliminating soft tissue swelling/inflammation/restriction, improving soft tissue extensibility and allowing for proper ROM for normal function with self care, mobility, lifting and ambulation. Modalities:  HP + PM x 15'   [] GAME READY (VASO)- for significant edema, swelling, pain control. Charges:  Timed Code Treatment Minutes: 45'   Total Treatment Minutes: 48'         [] EVAL (LOW) 18045 (typically 20 minutes face-to-face)  [] EVAL (MOD) 92088 (typically 30 minutes face-to-face)  [] EVAL (HIGH) 50136 (typically 45 minutes face-to-face)  [] RE-EVAL     [x] EW(19958) x 2    [] IONTO  [] NMR (58628) x     [] VASO  [x] Manual (91714) x 1     [] Other:  [] TA x      [] Mech Traction (84345)  [] ES(attended) (98486)      [x] ES (un) (90736): HP+PM x15'      GOALS:   Patient stated goal: To be able to do her job without R hip pain  []? Progressing: []? Met: []? Not Met: []? Adjusted     Therapist goals for Patient:   Short Term Goals:  To be achieved in:

## 2020-03-12 ENCOUNTER — HOSPITAL ENCOUNTER (OUTPATIENT)
Dept: PHYSICAL THERAPY | Age: 25
Setting detail: THERAPIES SERIES
Discharge: HOME | End: 2020-03-12
Payer: COMMERCIAL

## 2020-03-12 PROCEDURE — 97110 THERAPEUTIC EXERCISES: CPT

## 2020-03-12 NOTE — FLOWSHEET NOTE
STRENGHT (MMT)                                                             RESTRICTIONS/PRECAUTIONS: None    Exercises/Interventions:     [facility]: [id number]      Therapeutic Ex (52322) (23') Sets/sec Reps Notes/CUES   5\"x15  HEP   5\"x15  HEP   5\"x15  HEP   30\"x3  HEP   Bike warm up 5' L2 post mobs and manuals prior to therex     Trustretch HS stretch 3D  30\"x3     Anibal test stretch  30\"x3      5\"H, 2x15      Bridges abd GVL 3\"H, 2x15 ea               2x10x5\"             LBW GVL 15'x4     10x10\" withTA     SB squats 2x15      SB bridges 2x15                   10x3\" ea                 Pt education x6'  Process, mechanics, post/ant hip relationship   AT See AT note  B TKE/HAB/Glute         Manual Intervention (49673) (15')      Hip flexor release (RF release, iliacus release), 3'                                  NMR re-education (36484)   CUES NEEDED                                                         Therapeutic Activity (24347)                                                Therapeutic Exercise and NMR EXR  [x] (65255) Provided verbal/tactile cueing for activities related to strengthening, flexibility, endurance, ROM for improvements in LE, proximal hip, and core control with self care, mobility, lifting, ambulation.  [] (59280) Provided verbal/tactile cueing for activities related to improving balance, coordination, kinesthetic sense, posture, motor skill, proprioception  to assist with LE, proximal hip, and core control in self care, mobility, lifting, ambulation and eccentric single leg control.      NMR and Therapeutic Activities:    [] (86664 or 17100) Provided verbal/tactile cueing for activities related to improving balance, coordination, kinesthetic sense, posture, motor skill, proprioception and motor activation to allow for proper function of core, proximal hip and LE with self care and ADLs  [] (96630) Gait Re-education- Provided training and instruction to the patient for

## 2020-03-12 NOTE — FLOWSHEET NOTE
2x10                  Balance to Lunge                    Walking          Knee Extension Bilat. Ecc.                                Inv. Hamstring Curls Bilat. NV                              Ecc.                                Inv.          Soleus Press Bilat. Ecc.                            Inv.                                Ladders                 Square                Jump/Hop  Low                       Med.                       High                                                               Modality PM/MHP 15' PM/MHP 15'   Initials                             EP DTM   Time spent one on one (workers comp)     Time spent with PT assistant

## 2020-03-16 ENCOUNTER — HOSPITAL ENCOUNTER (OUTPATIENT)
Dept: PHYSICAL THERAPY | Age: 25
Setting detail: THERAPIES SERIES
End: 2020-03-16
Payer: COMMERCIAL

## 2020-03-19 ENCOUNTER — HOSPITAL ENCOUNTER (OUTPATIENT)
Dept: PHYSICAL THERAPY | Age: 25
Setting detail: THERAPIES SERIES
Discharge: HOME | End: 2020-03-19
Payer: COMMERCIAL

## 2020-03-23 ENCOUNTER — APPOINTMENT (OUTPATIENT)
Dept: PHYSICAL THERAPY | Age: 25
End: 2020-03-23
Payer: COMMERCIAL

## 2020-03-26 ENCOUNTER — APPOINTMENT (OUTPATIENT)
Dept: PHYSICAL THERAPY | Age: 25
End: 2020-03-26
Payer: COMMERCIAL

## 2020-07-22 RX ORDER — LEVONORGESTREL AND ETHINYL ESTRADIOL 150-30(84)
KIT ORAL
Qty: 91 TABLET | Refills: 0 | Status: SHIPPED | OUTPATIENT
Start: 2020-07-22 | End: 2021-02-09 | Stop reason: SDUPTHER

## 2020-08-11 ENCOUNTER — TELEPHONE (OUTPATIENT)
Dept: OBGYN CLINIC | Age: 25
End: 2020-08-11

## 2020-08-12 ENCOUNTER — OFFICE VISIT (OUTPATIENT)
Dept: OBGYN CLINIC | Age: 25
End: 2020-08-12
Payer: COMMERCIAL

## 2020-08-12 VITALS
TEMPERATURE: 99.9 F | SYSTOLIC BLOOD PRESSURE: 128 MMHG | DIASTOLIC BLOOD PRESSURE: 78 MMHG | WEIGHT: 159.6 LBS | BODY MASS INDEX: 24.27 KG/M2 | HEART RATE: 79 BPM

## 2020-08-12 PROCEDURE — 99385 PREV VISIT NEW AGE 18-39: CPT | Performed by: OBSTETRICS & GYNECOLOGY

## 2020-08-12 PROCEDURE — 76856 US EXAM PELVIC COMPLETE: CPT | Performed by: OBSTETRICS & GYNECOLOGY

## 2020-08-12 NOTE — PROGRESS NOTES
[facility]   Annual Exam        CC:   Chief Complaint   Patient presents with    Medication Refill     birth control pill       HPI:  22 y.o. Elsie Prather presents for her gynecologic annual exam.     She does admit to pelvic pain -- states its more on the right side, comes and goes without exacerbating factors (except sometimes when she goes from seated to standing position). Started around June. Describes pain as \"pressure\", will last for a few days then resolve. No issues with AUB bleeding associated with pain. Denies vaginal discharge / pain / dysuria / back pain. Denies F / C / SOB / CP. Health Maintenance:  Safe Enviroment: y  Healthy diet: y  Exercise: y  Sexually Active: y   Denies sexual problems  Contraception: Continuous OCP (cycle every 3 months) -- happy with BC     Screening:  Last pap smear: NA   History of abnormal pap smears: NA  STI History: Denies      Vaccines:  Gardasil vaccine: Y 2014-15   Flu vaccine:      Review of Systems:   Constitutional: Negative for appetite change, chills and fever. HENT: Negative for congestion, sneezing and sore throat. Eyes: Negative for visual disturbance. Respiratory: Negative for chest tightness, shortness of breath and wheezing. Cardiovascular: Negative for chest pain, palpitations and leg swelling. Gastrointestinal: Negative for abdominal pain, diarrhea, nausea and vomiting. Endocrine: Negative for heat intolerance. Genitourinary: Negative for difficulty urinating, dyspareunia, dysuria, + Pelvic Pain   Musculoskeletal: Negative for back pain, neck pain and neck stiffness. Skin: Negative for color change, pallor and rash. Allergic/Immunologic: Negative for environmental allergies, food allergies and immunocompromised state. Neurological: Negative for light-headedness, numbness and headaches. Hematological: Does not bruise/bleed easily. Psychiatric/Behavioral: Negative for behavioral problems, sleep disturbance and suicidal ideas. Primary Care Physician: AYAAN KERNS MD    Obstetric History  OB History    Para Term  AB Living   0 0 0 0 0 0   SAB TAB Ectopic Molar Multiple Live Births   0 0 0   0         Gynecologic History  Menstrual History:   LMP: Patient's last menstrual period was 2020 (approximate).     Menstrual Period: regular   Interval Between Menses: q 3 months   o Cont OCPs    Duration of Menses: 4-5d    Menstrual Flow: normal    Bleeding between menses: denies   Pain - Denies     MedicalHistory:  Past Medical History:   Diagnosis Date    Ovarian cyst, right     Seasonal allergies        Medications:  Current Outpatient Medications   Medication Sig Dispense Refill    DAYSEE 0.15-0.03 &0.01 MG TABS TAKE ONE TABLET BY MOUTH DAILY 91 tablet 0    diclofenac (VOLTAREN) 75 MG EC tablet Take 1 tablet by mouth 2 times daily (Patient not taking: Reported on 2020) 60 tablet 1     Current Facility-Administered Medications   Medication Dose Route Frequency Provider Last Rate Last Dose    etonogestrel (NEXPLANON) implant 68 mg  68 mg Subdermal Once [employer], DO   68 mg at 19 1704       Surgical History:  Past Surgical History:   Procedure Laterality Date    EPIDURAL STEROID INJECTION Right 2019    RIGHT LUMBAR FIVE SACRAL ONE EPIDURAL STEROID INJECTION SITE CONFIRMED BY FLUOROSCOPY performed by En Goldman MD at [facility]       Allergies:  No Known Allergies    Family History:  Family History   Problem Relation Age of Onset    Allergies Mother     Other Mother         endometriosis    Crohn's Disease Mother     Arthritis Mother     Cancer Maternal Grandmother 59        breast cancer     Thyroid Disease Maternal Grandfather     Heart Disease Maternal Grandfather 62    High Blood Pressure Maternal Grandfather     High Cholesterol Paternal Grandmother     High Blood Pressure Paternal Grandmother     High Cholesterol Paternal Grandfather     High Blood Pressure Paternal Grandfather     High Blood Pressure Father        (+) personal/family history of cervical, uterine, ovarian, vulvar, breast, or colon cancers. Social History:  Social History     Socioeconomic History    Marital status: Single     Spouse name: None    Number of children: None    Years of education: None    Highest education level: None   Occupational History    Occupation: student   Social Needs    Financial resource strain: None    Food insecurity     Worry: None     Inability: None    Transportation needs     Medical: None     Non-medical: None   Tobacco Use    Smoking status: Never Smoker    Smokeless tobacco: Never Used   Substance and Sexual Activity    Alcohol use: No    Drug use: No    Sexual activity: Never   Lifestyle    Physical activity     Days per week: None     Minutes per session: None    Stress: None   Relationships    Social connections     Talks on phone: None     Gets together: None     Attends Voodoo service: None     Active member of club or organization: None     Attends meetings of clubs or organizations: None     Relationship status: None    Intimate partner violence     Fear of current or ex partner: None     Emotionally abused: None     Physically abused: None     Forced sexual activity: None   Other Topics Concern    None   Social History Narrative    None       Objective: Body mass index is 24.27 kg/m².   /78 (Site: Left Upper Arm, Position: Sitting, Cuff Size: Medium Adult)   Pulse 79   Temp 99.9 °F (37.7 °C) (Oral)   Wt 159 lb 9.6 oz (72.4 kg)   LMP 07/13/2020 (Approximate)   BMI 24.27 kg/m²     Exam:   General: Alert, well appearing, no acute distress  Head: Normocephalic, atraumatic  Mouth: Mucous membranes moist, pharynx normal without lesions  Thyroid: No thyromegaly or masses present   Breasts: Symmetric, non-tender to palpation, no skin changes, palpable axillary lymph nodes or masses noted  Lungs: Respiratory effort within normal limits   CV: Regular rate   Abdomen: Soft, nontender, nondistended   Pelvic:   External: Normal appearing genitalia without masses, tenderness or lesions  Vagina: moist, pink mucosa, without abnormal discharge or lesions  Cervix: no masses or lesions visualized, no cervical motion tenderness  Uterus: mobile, midline, no masses palpated  Adnexa: mobile, non-tender to palpation, without masses  Rectovaginal: deferred  Extremities: No redness or tenderness, neg Miguelina's sign  Skin: Well perfused, normal coloration and turgor, no lesions or rashes visualized  Neuro: Alert, oriented, normal speech, no focal deficits, moves extremities appropriately  Osteopathic: No TART changes    Images:  PELVIC ULTRASOUND without DOPPLER INTERROGATION   NON OB    DATE: 8/12/20    PHYSICIAN: TOBIN Brady.     SONOGRAPHER: Rodri Sutton RDMS    INDICATION: pelvic pain    TYPE OF SCAN: abdominal    FINDINGS:    The cul de sac is normal. No free fluid appreciated. The cervix is normal and not enlarged. Nabothian cyst/s is not noted within the uterine cervix. The uterus measures 6.26 cm x 4.78 cm x 3.57 cm. The uterus is anteverted. The endometrium measures 9.56 mm. The myometrium is homogeneous in appearance. No uterine anomalies are noted. The right ovary is present and normal.  The right ovary measures 1.29 cm x 1.81 cm x 1.64 cm. Ovary findings: No masses seen. The right adnexa is normal.    The left ovary is present and normal.  The left ovary measures 1.53 cm x 2.74 cm x 1.59 cm. Ovary findings: No masses seen. The left adnexa is normal.    IMPRESSION: Normal uterus. Normal right ovary. Normal left ovary. Assessment/Plan:  22 y.o. Yoli Barnett presenting for her annual exam:     Diagnosis Orders   1. Women's annual routine gynecological examination     2. Pap smear for cervical cancer screening  PAP SMEAR   3. Screening breast examination     4. Encounter for surveillance of contraceptive pills     5.  Pelvic pain       - reviewed US report -- normal anatomy, pain suggestive of ovarian cysts, none noted on exam today   - return precautions reviewed   - continue OCPS as prescribed -- call if refill needed    Annual Visit Recommendations:   Clinical breast exam was done and self-breast exam and self-breast awareness reviewed. Pelvic exam was done and ASCCP guidelines reviewed   Reviewed healthy diet and daily multivitamin use. Reviewed recommendations on Calcium and Vitamin D intake. Discussed seatbelt use. Follow Up  - Will call patient with results   -Return in about 1 year (around 8/12/2021) for Annual or sooner if needed.      (FYI: this is a patient of Dr. Divine Geiger if she needs scheduled)     Kimberley Suarez,

## 2020-09-22 ENCOUNTER — APPOINTMENT (OUTPATIENT)
Dept: GENERAL RADIOLOGY | Age: 25
End: 2020-09-22
Payer: COMMERCIAL

## 2020-09-22 ENCOUNTER — HOSPITAL ENCOUNTER (EMERGENCY)
Age: 25
Discharge: HOME | End: 2020-09-22
Payer: COMMERCIAL

## 2020-09-22 VITALS
HEART RATE: 77 BPM | BODY MASS INDEX: 25.11 KG/M2 | WEIGHT: 160 LBS | HEIGHT: 67 IN | DIASTOLIC BLOOD PRESSURE: 63 MMHG | OXYGEN SATURATION: 98 % | TEMPERATURE: 99 F | SYSTOLIC BLOOD PRESSURE: 113 MMHG | RESPIRATION RATE: 16 BRPM

## 2020-09-22 LAB
A/G RATIO: 1.7 (ref 1.1–2.2)
ALBUMIN SERPL-MCNC: 4.7 G/DL (ref 3.4–5)
ALP BLD-CCNC: 50 U/L (ref 40–129)
ALT SERPL-CCNC: 9 U/L (ref 10–40)
ANION GAP SERPL CALCULATED.3IONS-SCNC: 12 MMOL/L (ref 3–16)
AST SERPL-CCNC: 17 U/L (ref 15–37)
BASOPHILS ABSOLUTE: 0 K/UL (ref 0–0.2)
BASOPHILS RELATIVE PERCENT: 1.1 %
BILIRUB SERPL-MCNC: 0.7 MG/DL (ref 0–1)
BUN BLDV-MCNC: 8 MG/DL (ref 7–20)
CALCIUM SERPL-MCNC: 9.2 MG/DL (ref 8.3–10.6)
CHLORIDE BLD-SCNC: 102 MMOL/L (ref 99–110)
CO2: 23 MMOL/L (ref 21–32)
CREAT SERPL-MCNC: 0.8 MG/DL (ref 0.6–1.1)
D DIMER: <200 NG/ML DDU (ref 0–229)
EKG ATRIAL RATE: 70 BPM
EKG DIAGNOSIS: NORMAL
EKG P AXIS: 60 DEGREES
EKG P-R INTERVAL: 126 MS
EKG Q-T INTERVAL: 392 MS
EKG QRS DURATION: 82 MS
EKG QTC CALCULATION (BAZETT): 423 MS
EKG R AXIS: 65 DEGREES
EKG T AXIS: 32 DEGREES
EKG VENTRICULAR RATE: 70 BPM
EOSINOPHILS ABSOLUTE: 0 K/UL (ref 0–0.6)
EOSINOPHILS RELATIVE PERCENT: 1 %
GFR AFRICAN AMERICAN: >60
GFR NON-AFRICAN AMERICAN: >60
GLOBULIN: 2.7 G/DL
GLUCOSE BLD-MCNC: 115 MG/DL (ref 70–99)
HCG QUALITATIVE: NEGATIVE
HCT VFR BLD CALC: 37.9 % (ref 36–48)
HEMOGLOBIN: 12.9 G/DL (ref 12–16)
LYMPHOCYTES ABSOLUTE: 0.7 K/UL (ref 1–5.1)
LYMPHOCYTES RELATIVE PERCENT: 15.5 %
MCH RBC QN AUTO: 30.3 PG (ref 26–34)
MCHC RBC AUTO-ENTMCNC: 34.2 G/DL (ref 31–36)
MCV RBC AUTO: 88.6 FL (ref 80–100)
MONOCYTES ABSOLUTE: 0.3 K/UL (ref 0–1.3)
MONOCYTES RELATIVE PERCENT: 7.1 %
NEUTROPHILS ABSOLUTE: 3.2 K/UL (ref 1.7–7.7)
NEUTROPHILS RELATIVE PERCENT: 75.3 %
PDW BLD-RTO: 12.3 % (ref 12.4–15.4)
PLATELET # BLD: 205 K/UL (ref 135–450)
PMV BLD AUTO: 9.8 FL (ref 5–10.5)
POTASSIUM SERPL-SCNC: 4 MMOL/L (ref 3.5–5.1)
RBC # BLD: 4.27 M/UL (ref 4–5.2)
SODIUM BLD-SCNC: 137 MMOL/L (ref 136–145)
TOTAL PROTEIN: 7.4 G/DL (ref 6.4–8.2)
TROPONIN: <0.01 NG/ML
WBC # BLD: 4.2 K/UL (ref 4–11)

## 2020-09-22 PROCEDURE — 99285 EMERGENCY DEPT VISIT HI MDM: CPT

## 2020-09-22 PROCEDURE — 93010 ELECTROCARDIOGRAM REPORT: CPT | Performed by: INTERNAL MEDICINE

## 2020-09-22 PROCEDURE — 84703 CHORIONIC GONADOTROPIN ASSAY: CPT

## 2020-09-22 PROCEDURE — 85379 FIBRIN DEGRADATION QUANT: CPT

## 2020-09-22 PROCEDURE — 85025 COMPLETE CBC W/AUTO DIFF WBC: CPT

## 2020-09-22 PROCEDURE — 80053 COMPREHEN METABOLIC PANEL: CPT

## 2020-09-22 PROCEDURE — 93005 ELECTROCARDIOGRAM TRACING: CPT | Performed by: EMERGENCY MEDICINE

## 2020-09-22 PROCEDURE — 36415 COLL VENOUS BLD VENIPUNCTURE: CPT

## 2020-09-22 PROCEDURE — 71045 X-RAY EXAM CHEST 1 VIEW: CPT

## 2020-09-22 PROCEDURE — 84484 ASSAY OF TROPONIN QUANT: CPT

## 2020-09-22 ASSESSMENT — PAIN DESCRIPTION - LOCATION
LOCATION: CHEST
LOCATION: CHEST

## 2020-09-22 ASSESSMENT — PAIN DESCRIPTION - PAIN TYPE
TYPE: ACUTE PAIN
TYPE: ACUTE PAIN

## 2020-09-22 ASSESSMENT — PAIN SCALES - GENERAL
PAINLEVEL_OUTOF10: 3
PAINLEVEL_OUTOF10: 3
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
## 2020-09-30 NOTE — PROGRESS NOTES
[address] Office consultation  Note  10/1/2020   Ref by Oliverio Huerta  Subjective:  Ms. Prateek Esposito is a 22 y.o. female who presents for evaluation of fluttering in chest  and palpitations. HPI: Rosalio Artis presented to the ER on 9/22/2020 with a sudden onset of fluttering  and rapid heart beat. She reported associated shortness of breath. She was woken up with it. She then passed out. She continued to feel rapid heart beat and was taken to ED. Her EKG at that time showed sinus rhythm rate 70. Her CXR at that time was normal. Symptoms started one year ago and have become more frequent and lasting longer. She denies any chest pain. Today she reports that for the last year, she has been feeling her heart fluttering and racing. when this occurs, she feels associated shortness of breath and diaphoresis. These symptoms occur every day and can come on both at rest and on exertion. These episodes are lasting long periods of time, up to 20 minutes. She states that last Tuesday, she woke up feeling her heart racing. She then walked down her stairs and blacked out. She then proceeded to the ER. The night prior, she denies any use of a stimulant of any kind. Review of Systems:  12 point ROS negative in all areas as listed below except as in [place]  Constitutional, EENT, Cardiovascular, pulmonary, GI, , Musculoskeletal, skin, neurological, hematological, endocrine, Psychiatric    Reviewed past medical history, social, and family history. 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  She does not smoke or drink excessive alcohol  She takes birth control pills and has regular three month cycle.   Mother: afib, diagnosed age 50  Father: no heart disease  No family history of CAD  Past Medical History:   Diagnosis Date    Ovarian cyst, right     Seasonal allergies      Past Surgical History:   Procedure Laterality Date    EPIDURAL STEROID INJECTION Right 1/21/2019    RIGHT LUMBAR FIVE SACRAL ONE EPIDURAL STEROID INJECTION SITE CONFIRMED BY FLUOROSCOPY performed by Clari Rivas MD at [place] [address]       Objective:   /66   Pulse 100   Wt 160 lb 8 oz (72.8 kg)   LMP 09/22/2020   SpO2 98%   BMI 25.14 kg/m²     Wt Readings from Last 3 Encounters:   10/01/20 160 lb 8 oz (72.8 kg)   09/22/20 160 lb (72.6 kg)   08/12/20 159 lb 9.6 oz (72.4 kg)       Physical Exam:  General: No Respiratory distress, appears well developed and well nourished. Eyes:  Sclera nonicteric  Nose/Sinuses:  negative findings: nose shows no deformity, asymmetry, or inflammation, nasal mucosa normal, septum midline with no perforation or bleeding  Back:  no pain to palpation  Joint:  no active joint inflammation  Musculoskeletal:  negative  Skin:  Warm and dry  Neck:  Negative for JVD and Carotid Bruits. Chest:  Clear to auscultation, respiration easy  Cardiovascular:  RRR, S1S2 normal, 1/6 systolic murmur in the mitral area, no rub or thrill. Abdomen:  Soft normal liver and spleen  Extremities:   No edema, clubbing, cyanosis,  Pulses:  pedal pulses are normal.  Neuro: intact    Medications:   Outpatient Encounter Medications as of 10/1/2020   Medication Sig Dispense Refill    DAYSEE 0.15-0.03 &0.01 MG TABS TAKE ONE TABLET BY MOUTH DAILY 91 tablet 0     Facility-Administered Encounter Medications as of 10/1/2020   Medication Dose Route Frequency Provider Last Rate Last Dose    etonogestrel (NEXPLANON) implant 68 mg  68 mg Subdermal Once [employer], DO   68 mg at 05/06/19 1704        Lab Data:  CBC: No results for input(s): WBC, HGB, HCT, MCV, PLT in the last 72 hours. BMP: No results for input(s): NA, K, CL, CO2, PHOS, BUN, CREATININE in the last 72 hours. Invalid input(s): CA  LIVER PROFILE: No results for input(s): AST, ALT, LIPASE, BILIDIR, BILITOT, ALKPHOS in the last 72 hours. Invalid input(s):   AMYLASE,  ALB  LIPID: No results found for: CHOL  No results found for: TRIG  No results found for: HDL  No results found for: Paz Ochoa  No results found for: LABVLDL, VLDL  No results found for: CHOLHDLRATIO  PT/INR: No results for input(s): PROTIME, INR in the last 72 hours. A1C: No results found for: LABA1C  BNP:  No results for input(s): BNP in the last 72 hours. IMAGING:   EKG 9/22/2020 Sinus rhythm rate 70    CXR 9/22/2020  FINDINGS: The lungs are without acute focal process. There is no effusion or pneumothorax. The cardiomediastinal silhouette is without acute process. The osseous structures are without acute process. Assessment:  Emiliano Thrasher was seen today for new patient and palpitations. Diagnoses and all orders for this visit:    Palpitations  -     Cardiac event monitor; Future  -     Echo 2D w doppler w color complete; Future  -     TSH with Reflex; Future      I have reviewed her labs in epic that she had during ED visit. No significant abnormality. Plan:  1. 1 week cardiac event monitor to attempt to visualize your heart rhythm associated to your symptoms. 2. Echocardiogram to evaluate heart function and structure, do it after monitor is completed. 3. Follow up with me after testing. This note was scribed in the presence of Anuradha Bryant MD by Shawn Sadler RN.    I, Dr. Augusta Neves, personally performed the services described in this documentation, as scribed by the above signed scribe in my presence. It is both accurate and complete to my knowledge. I agree with the details independently gathered by the clinical support staff, while the remaining scribed note accurately describes my personal service to the patient.         [address] Sandy Escalera MD 10/1/2020 10:04 AM

## 2020-10-01 ENCOUNTER — HOSPITAL ENCOUNTER (OUTPATIENT)
Age: 25
Discharge: HOME | End: 2020-10-01
Payer: COMMERCIAL

## 2020-10-01 ENCOUNTER — OFFICE VISIT (OUTPATIENT)
Dept: CARDIOLOGY CLINIC | Age: 25
End: 2020-10-01
Payer: COMMERCIAL

## 2020-10-01 VITALS
DIASTOLIC BLOOD PRESSURE: 66 MMHG | SYSTOLIC BLOOD PRESSURE: 128 MMHG | OXYGEN SATURATION: 98 % | WEIGHT: 160.5 LBS | HEART RATE: 100 BPM | BODY MASS INDEX: 25.14 KG/M2

## 2020-10-01 PROBLEM — R00.2 PALPITATIONS: Chronic | Status: ACTIVE | Noted: 2020-10-01

## 2020-10-01 PROBLEM — R00.2 PALPITATIONS: Status: ACTIVE | Noted: 2020-10-01

## 2020-10-01 LAB — TSH REFLEX: 2.02 UIU/ML (ref 0.27–4.2)

## 2020-10-01 PROCEDURE — 36415 COLL VENOUS BLD VENIPUNCTURE: CPT

## 2020-10-01 PROCEDURE — 99244 OFF/OP CNSLTJ NEW/EST MOD 40: CPT | Performed by: INTERNAL MEDICINE

## 2020-10-01 PROCEDURE — 84443 ASSAY THYROID STIM HORMONE: CPT

## 2020-10-01 NOTE — PATIENT INSTRUCTIONS
Plan:  1. 1 week cardiac event monitor to attempt to visualize your heart rhythm associated to your symptoms. 2. Echocardiogram to evaluate heart function and structure. 3. Follow up with me after testing. Your provider has ordered testing for further evaluation. An order/prescription has been included in your paper work.  To schedule outpatient testing, contact Central Scheduling by calling [employer] ([phone]).

## 2020-10-12 PROCEDURE — 93228 REMOTE 30 DAY ECG REV/REPORT: CPT | Performed by: INTERNAL MEDICINE

## 2020-10-13 ENCOUNTER — TELEPHONE (OUTPATIENT)
Dept: CARDIOLOGY CLINIC | Age: 25
End: 2020-10-13

## 2020-10-13 NOTE — TELEPHONE ENCOUNTER
----- Message from Gautam Larry MD sent at 10/13/2020 10:44 AM EDT -----  One week monitor shows most normal rhythm   There is sinus tachycardia which is accelrated normal rhythm felt as racing of heart mostly during day. At night heart rate is slower and has some extra beats. She has felt these extra beats ( PVC.) as fluttering in chest.  Will wait for echo report before deciding on medications.       Per hippa form Patient notified of test results, message given on AM, left phone number to call should there be any questions Vanda Mccracken RN

## 2020-11-17 ENCOUNTER — HOSPITAL ENCOUNTER (OUTPATIENT)
Dept: CARDIOLOGY | Age: 25
Discharge: HOME | End: 2020-11-17
Payer: COMMERCIAL

## 2020-11-17 ENCOUNTER — TELEPHONE (OUTPATIENT)
Dept: CARDIOLOGY CLINIC | Age: 25
End: 2020-11-17

## 2020-11-17 LAB
LV EF: 55 %
LVEF MODALITY: NORMAL

## 2020-11-17 PROCEDURE — 93306 TTE W/DOPPLER COMPLETE: CPT

## 2020-11-17 NOTE — TELEPHONE ENCOUNTER
----- Message from Pablo Fajardo MD sent at 11/17/2020 11:52 AM EST -----  She has some extra heart beats that she feels as palpitations. Not  A serious problem. Echo cardiogram shows normal heart function. We can try magnesium 64 mg over the counter one pill twice a day see if that helps If not then I can prescribe a beta blocker.

## 2021-02-08 DIAGNOSIS — Z30.011 ENCOUNTER FOR INITIAL PRESCRIPTION OF CONTRACEPTIVE PILLS: ICD-10-CM

## 2021-02-09 RX ORDER — LEVONORGESTREL / ETHINYL ESTRADIOL AND ETHINYL ESTRADIOL 150-30(84)
KIT ORAL
Qty: 91 TABLET | Refills: 1 | Status: SHIPPED | OUTPATIENT
Start: 2021-02-09 | End: 2021-08-13 | Stop reason: SDUPTHER

## 2021-02-09 RX ORDER — LEVONORGESTREL / ETHINYL ESTRADIOL AND ETHINYL ESTRADIOL 150-30(84)
KIT ORAL
Qty: 91 TABLET | Refills: 1 | OUTPATIENT
Start: 2021-02-09

## 2021-08-13 ENCOUNTER — OFFICE VISIT (OUTPATIENT)
Dept: OBGYN CLINIC | Age: 26
End: 2021-08-13
Payer: COMMERCIAL

## 2021-08-13 VITALS
TEMPERATURE: 97.7 F | HEART RATE: 87 BPM | BODY MASS INDEX: 22.12 KG/M2 | SYSTOLIC BLOOD PRESSURE: 120 MMHG | DIASTOLIC BLOOD PRESSURE: 72 MMHG | WEIGHT: 141.2 LBS

## 2021-08-13 DIAGNOSIS — Z01.419 WOMEN'S ANNUAL ROUTINE GYNECOLOGICAL EXAMINATION: Primary | ICD-10-CM

## 2021-08-13 DIAGNOSIS — Z30.011 ENCOUNTER FOR INITIAL PRESCRIPTION OF CONTRACEPTIVE PILLS: ICD-10-CM

## 2021-08-13 DIAGNOSIS — Z53.8 PAP SMEAR OF CERVIX NOT NEEDED: ICD-10-CM

## 2021-08-13 DIAGNOSIS — Z79.3 ON ORAL CONTRACEPTIVE PILLS FOR NON-CONTRACEPTION INDICATION: ICD-10-CM

## 2021-08-13 PROCEDURE — 99395 PREV VISIT EST AGE 18-39: CPT | Performed by: OBSTETRICS & GYNECOLOGY

## 2021-08-13 RX ORDER — LEVONORGESTREL / ETHINYL ESTRADIOL AND ETHINYL ESTRADIOL 150-30(84)
KIT ORAL
Qty: 91 TABLET | Refills: 3 | Status: SHIPPED | OUTPATIENT
Start: 2021-08-13 | End: 2022-07-27 | Stop reason: SDUPTHER

## 2021-09-08 PROBLEM — Z53.8 PAP SMEAR OF CERVIX NOT NEEDED: Status: ACTIVE | Noted: 2021-09-08

## 2021-09-08 PROBLEM — Z01.419 WOMEN'S ANNUAL ROUTINE GYNECOLOGICAL EXAMINATION: Status: ACTIVE | Noted: 2021-09-08

## 2021-09-08 ASSESSMENT — ENCOUNTER SYMPTOMS
SHORTNESS OF BREATH: 0
ABDOMINAL PAIN: 0
DIARRHEA: 0
VOMITING: 0
NAUSEA: 0
CONSTIPATION: 0
ABDOMINAL DISTENTION: 0

## 2021-09-09 NOTE — PROGRESS NOTES
Subjective:      Patient ID: Magi Rodriguez is a 26 y.o. female. HPI  25 y/o [de-identified] female presents for well woman examination. Last pap smear was 8/12/2020--normal (no endocervical cells). No history of abnormal pap smear. Menses occur every 3 months x 4-5 days, normal flow, no dysmenorrhea. Has had chronic issues with right lower quadrant pain--felt to be secondary to ovarian cysts--pain has improved over time--less often than in the past.  Ultrasound on 8/12/2020 indicated normal findings. No sexual activity. Review of Systems   Constitutional: Negative for activity change, appetite change, chills, fatigue, fever and unexpected weight change. Respiratory: Negative for shortness of breath. Cardiovascular: Negative for chest pain. Gastrointestinal: Negative for abdominal distention, abdominal pain, constipation, diarrhea, nausea and vomiting. Endocrine: Negative for cold intolerance and heat intolerance. Genitourinary: Positive for pelvic pain. Negative for difficulty urinating, dyspareunia, dysuria, frequency, genital sores, hematuria, menstrual problem, vaginal bleeding, vaginal discharge and vaginal pain. Skin: Negative for rash. Neurological: Negative for headaches. Hematological: Does not bruise/bleed easily. Objective:   Physical Exam  Vitals and nursing note reviewed. Constitutional:       General: She is not in acute distress. Appearance: Normal appearance. She is well-developed. She is not ill-appearing or toxic-appearing. HENT:      Head: Normocephalic and atraumatic. Neck:      Thyroid: No thyromegaly. Trachea: Trachea normal.   Cardiovascular:      Rate and Rhythm: Normal rate and regular rhythm. Heart sounds: Normal heart sounds, S1 normal and S2 normal.   Pulmonary:      Effort: Pulmonary effort is normal. No respiratory distress. Breath sounds: Normal breath sounds.    Abdominal:      General: Bowel sounds are normal. There is no distension. Palpations: Abdomen is soft. Abdomen is not rigid. There is no mass. Tenderness: There is no abdominal tenderness. There is no guarding or rebound. Musculoskeletal:         General: Normal range of motion. Cervical back: Neck supple. Skin:     General: Skin is warm and dry. Findings: No erythema or rash. Nails: There is no clubbing. Neurological:      Mental Status: She is alert and oriented to person, place, and time. Psychiatric:         Speech: Speech normal.         Behavior: Behavior normal. Behavior is cooperative. Thought Content: Thought content normal.         Judgment: Judgment normal.         Assessment:       Diagnosis Orders   1. Women's annual routine gynecological examination     2. Encounter for initial prescription of contraceptive pills  Levonorgest-Eth Estrad 91-Day (Yenifer Leming) 0.15-0.03 &0.01 MG TABS   3. Pap smear of cervix not needed     4.  On oral contraceptive pills for non-contraception indication             Plan:      Rx refill OCPs  Follow up prn and 1 year for well woman examination        Susanna Sacks, DO

## 2021-10-08 PROBLEM — Z01.419 WOMEN'S ANNUAL ROUTINE GYNECOLOGICAL EXAMINATION: Status: RESOLVED | Noted: 2021-09-08 | Resolved: 2021-10-08

## 2022-07-19 ENCOUNTER — OFFICE VISIT (OUTPATIENT)
Dept: FAMILY MEDICINE CLINIC | Age: 27
End: 2022-07-19
Payer: COMMERCIAL

## 2022-07-19 VITALS
HEIGHT: 67 IN | HEART RATE: 85 BPM | DIASTOLIC BLOOD PRESSURE: 70 MMHG | BODY MASS INDEX: 25.49 KG/M2 | SYSTOLIC BLOOD PRESSURE: 110 MMHG | OXYGEN SATURATION: 99 % | WEIGHT: 162.4 LBS

## 2022-07-19 DIAGNOSIS — R00.2 PALPITATIONS: ICD-10-CM

## 2022-07-19 DIAGNOSIS — Z23 NEED FOR DIPHTHERIA-TETANUS-PERTUSSIS (TDAP) VACCINE: ICD-10-CM

## 2022-07-19 DIAGNOSIS — Z76.89 ENCOUNTER TO ESTABLISH CARE: Primary | ICD-10-CM

## 2022-07-19 DIAGNOSIS — Z00.00 HEALTHCARE MAINTENANCE: ICD-10-CM

## 2022-07-19 DIAGNOSIS — R53.83 FATIGUE, UNSPECIFIED TYPE: ICD-10-CM

## 2022-07-19 LAB
A/G RATIO: 2 (ref 1.1–2.2)
ALBUMIN SERPL-MCNC: 4.7 G/DL (ref 3.4–5)
ALP BLD-CCNC: 33 U/L (ref 40–129)
ALT SERPL-CCNC: 11 U/L (ref 10–40)
ANION GAP SERPL CALCULATED.3IONS-SCNC: 11 MMOL/L (ref 3–16)
AST SERPL-CCNC: 13 U/L (ref 15–37)
BASOPHILS ABSOLUTE: 0 K/UL (ref 0–0.2)
BASOPHILS RELATIVE PERCENT: 0.7 %
BILIRUB SERPL-MCNC: 0.5 MG/DL (ref 0–1)
BUN BLDV-MCNC: 9 MG/DL (ref 7–20)
CALCIUM SERPL-MCNC: 9.3 MG/DL (ref 8.3–10.6)
CHLORIDE BLD-SCNC: 102 MMOL/L (ref 99–110)
CO2: 23 MMOL/L (ref 21–32)
CREAT SERPL-MCNC: 0.9 MG/DL (ref 0.6–1.1)
EOSINOPHILS ABSOLUTE: 0 K/UL (ref 0–0.6)
EOSINOPHILS RELATIVE PERCENT: 1.2 %
FERRITIN: 30.1 NG/ML (ref 15–150)
GFR AFRICAN AMERICAN: >60
GFR NON-AFRICAN AMERICAN: >60
GLUCOSE BLD-MCNC: 93 MG/DL (ref 70–99)
HCT VFR BLD CALC: 38.5 % (ref 36–48)
HEMOGLOBIN: 13.1 G/DL (ref 12–16)
HEPATITIS C ANTIBODY INTERPRETATION: NORMAL
IRON SATURATION: 31 % (ref 15–50)
IRON: 152 UG/DL (ref 37–145)
LYMPHOCYTES ABSOLUTE: 0.7 K/UL (ref 1–5.1)
LYMPHOCYTES RELATIVE PERCENT: 17.5 %
MAGNESIUM: 2 MG/DL (ref 1.8–2.4)
MCH RBC QN AUTO: 30.6 PG (ref 26–34)
MCHC RBC AUTO-ENTMCNC: 34.1 G/DL (ref 31–36)
MCV RBC AUTO: 89.7 FL (ref 80–100)
MONOCYTES ABSOLUTE: 0.2 K/UL (ref 0–1.3)
MONOCYTES RELATIVE PERCENT: 5.8 %
NEUTROPHILS ABSOLUTE: 3 K/UL (ref 1.7–7.7)
NEUTROPHILS RELATIVE PERCENT: 74.8 %
PDW BLD-RTO: 12.3 % (ref 12.4–15.4)
PLATELET # BLD: 267 K/UL (ref 135–450)
PMV BLD AUTO: 9.3 FL (ref 5–10.5)
POTASSIUM REFLEX MAGNESIUM: 5 MMOL/L (ref 3.5–5.1)
RBC # BLD: 4.29 M/UL (ref 4–5.2)
SODIUM BLD-SCNC: 136 MMOL/L (ref 136–145)
T4 FREE: 1.4 NG/DL (ref 0.9–1.8)
TOTAL IRON BINDING CAPACITY: 495 UG/DL (ref 260–445)
TOTAL PROTEIN: 7.1 G/DL (ref 6.4–8.2)
TSH SERPL DL<=0.05 MIU/L-ACNC: 2.78 UIU/ML (ref 0.27–4.2)
VITAMIN B-12: <150 PG/ML (ref 211–911)
VITAMIN D 25-HYDROXY: 32.4 NG/ML
WBC # BLD: 4 K/UL (ref 4–11)

## 2022-07-19 PROCEDURE — 99203 OFFICE O/P NEW LOW 30 MIN: CPT | Performed by: NURSE PRACTITIONER

## 2022-07-19 PROCEDURE — 90715 TDAP VACCINE 7 YRS/> IM: CPT | Performed by: NURSE PRACTITIONER

## 2022-07-19 PROCEDURE — 99385 PREV VISIT NEW AGE 18-39: CPT | Performed by: NURSE PRACTITIONER

## 2022-07-19 PROCEDURE — 90471 IMMUNIZATION ADMIN: CPT | Performed by: NURSE PRACTITIONER

## 2022-07-19 SDOH — HEALTH STABILITY: PHYSICAL HEALTH: ON AVERAGE, HOW MANY DAYS PER WEEK DO YOU ENGAGE IN MODERATE TO STRENUOUS EXERCISE (LIKE A BRISK WALK)?: 0 DAYS

## 2022-07-19 SDOH — ECONOMIC STABILITY: FOOD INSECURITY: WITHIN THE PAST 12 MONTHS, THE FOOD YOU BOUGHT JUST DIDN'T LAST AND YOU DIDN'T HAVE MONEY TO GET MORE.: NEVER TRUE

## 2022-07-19 SDOH — HEALTH STABILITY: PHYSICAL HEALTH: ON AVERAGE, HOW MANY MINUTES DO YOU ENGAGE IN EXERCISE AT THIS LEVEL?: 0 MIN

## 2022-07-19 SDOH — ECONOMIC STABILITY: TRANSPORTATION INSECURITY
IN THE PAST 12 MONTHS, HAS THE LACK OF TRANSPORTATION KEPT YOU FROM MEDICAL APPOINTMENTS OR FROM GETTING MEDICATIONS?: NO

## 2022-07-19 SDOH — ECONOMIC STABILITY: HOUSING INSECURITY: IN THE LAST 12 MONTHS, HOW MANY PLACES HAVE YOU LIVED?: 1

## 2022-07-19 SDOH — ECONOMIC STABILITY: TRANSPORTATION INSECURITY
IN THE PAST 12 MONTHS, HAS LACK OF TRANSPORTATION KEPT YOU FROM MEETINGS, WORK, OR FROM GETTING THINGS NEEDED FOR DAILY LIVING?: NO

## 2022-07-19 SDOH — ECONOMIC STABILITY: INCOME INSECURITY: IN THE LAST 12 MONTHS, WAS THERE A TIME WHEN YOU WERE NOT ABLE TO PAY THE MORTGAGE OR RENT ON TIME?: NO

## 2022-07-19 SDOH — ECONOMIC STABILITY: HOUSING INSECURITY
IN THE LAST 12 MONTHS, WAS THERE A TIME WHEN YOU DID NOT HAVE A STEADY PLACE TO SLEEP OR SLEPT IN A SHELTER (INCLUDING NOW)?: NO

## 2022-07-19 SDOH — ECONOMIC STABILITY: FOOD INSECURITY: WITHIN THE PAST 12 MONTHS, YOU WORRIED THAT YOUR FOOD WOULD RUN OUT BEFORE YOU GOT MONEY TO BUY MORE.: NEVER TRUE

## 2022-07-19 ASSESSMENT — PATIENT HEALTH QUESTIONNAIRE - PHQ9
SUM OF ALL RESPONSES TO PHQ QUESTIONS 1-9: 0
1. LITTLE INTEREST OR PLEASURE IN DOING THINGS: 0
2. FEELING DOWN, DEPRESSED OR HOPELESS: 0
SUM OF ALL RESPONSES TO PHQ QUESTIONS 1-9: 0
SUM OF ALL RESPONSES TO PHQ QUESTIONS 1-9: 0
SUM OF ALL RESPONSES TO PHQ9 QUESTIONS 1 & 2: 0
SUM OF ALL RESPONSES TO PHQ QUESTIONS 1-9: 0

## 2022-07-19 ASSESSMENT — ANXIETY QUESTIONNAIRES
4. TROUBLE RELAXING: 1
5. BEING SO RESTLESS THAT IT IS HARD TO SIT STILL: 1
2. NOT BEING ABLE TO STOP OR CONTROL WORRYING: 1
GAD7 TOTAL SCORE: 11
3. WORRYING TOO MUCH ABOUT DIFFERENT THINGS: 1
IF YOU CHECKED OFF ANY PROBLEMS ON THIS QUESTIONNAIRE, HOW DIFFICULT HAVE THESE PROBLEMS MADE IT FOR YOU TO DO YOUR WORK, TAKE CARE OF THINGS AT HOME, OR GET ALONG WITH OTHER PEOPLE: SOMEWHAT DIFFICULT
1. FEELING NERVOUS, ANXIOUS, OR ON EDGE: 2
7. FEELING AFRAID AS IF SOMETHING AWFUL MIGHT HAPPEN: 2
6. BECOMING EASILY ANNOYED OR IRRITABLE: 3

## 2022-07-19 ASSESSMENT — ENCOUNTER SYMPTOMS
GASTROINTESTINAL NEGATIVE: 1
RESPIRATORY NEGATIVE: 1
EYES NEGATIVE: 1

## 2022-07-19 ASSESSMENT — SOCIAL DETERMINANTS OF HEALTH (SDOH)
HOW HARD IS IT FOR YOU TO PAY FOR THE VERY BASICS LIKE FOOD, HOUSING, MEDICAL CARE, AND HEATING?: NOT HARD AT ALL
WITHIN THE LAST YEAR, HAVE YOU BEEN KICKED, HIT, SLAPPED, OR OTHERWISE PHYSICALLY HURT BY YOUR PARTNER OR EX-PARTNER?: NO
WITHIN THE LAST YEAR, HAVE YOU BEEN HUMILIATED OR EMOTIONALLY ABUSED IN OTHER WAYS BY YOUR PARTNER OR EX-PARTNER?: NO
WITHIN THE LAST YEAR, HAVE YOU BEEN AFRAID OF YOUR PARTNER OR EX-PARTNER?: NO
WITHIN THE LAST YEAR, HAVE TO BEEN RAPED OR FORCED TO HAVE ANY KIND OF SEXUAL ACTIVITY BY YOUR PARTNER OR EX-PARTNER?: NO

## 2022-07-19 NOTE — PROGRESS NOTES
2022    Melinda Lynn (:  1995) is a 23 y.o. female, here for a preventive medicine evaluation. Pt is here today to establish care as a new pt. Previous pt of Dr. Fady Vincent, but states that she has not seen her in a long time. Single. No children. 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    Lives with her mother and siblings. Denies alcohol, drug or tobacco use. Maternal and paternal grandmothers had breast cancer (diagnosed in their 63's). Her mother has a h/o afib. Denies family h/o cervical cancer or colon cancer. She is not currently sexually active. Follows RAC[facility] Taylor on routine pap smears (last one was 2021). Denies h/o abnormal pap. Takes BCP's as directed. Pt c/o chronic fatigue. Last TSH was in  - normal.  Also c/o feeling cold \"all the time, even if it's 80 degrees outside\". Also c/o headaches when at work, possible r/t stress? H/o palpitations  Was seen in the ER and cardiology in . No findings. Pt states that she is still having episodes of palpitations and feels like her heart is racing. Trena Ma MD   10/13/2020 10:44 AM EDT         One week monitor shows most normal rhythm   There is sinus tachycardia which isaccelrated normal rhythm felt as racing of heart mostly during day. At night heart rate is slower and has some extra beats. She has felt theseextra beats ( PVC.) as fluttering in chest.  Will wait for echo report before deciding on medications. Note     ----- Message from Trena Ma MD sent at 2020 11:52 AM EST -----  She has some extra heart beats that she feels as palpitations. Not  A serious problem. Echo cardiogram shows normal heart function. We can try magnesium 64 mg over the counter one pill twice a day see if that helps If not then I can prescribe a beta blocker. Pt states that she tried the magnesium, but did not notice a difference.   She did not f/u with  [facility] as advised. She is no longer taking magnesium. Patient Active Problem List   Diagnosis    History of ovarian cyst    On oral contraceptive pills for non-contraception indication    Palpitations    Pap smear of cervix not needed       Review of Systems   Constitutional:  Positive for fatigue. Negative for activity change, appetite change, chills, diaphoresis, fever and unexpected weight change. HENT: Negative. Eyes: Negative. Respiratory: Negative. Cardiovascular:  Positive for palpitations. Negative for chest pain and leg swelling. Gastrointestinal: Negative. Endocrine: Positive for cold intolerance. Negative for heat intolerance, polydipsia, polyphagia and polyuria. Genitourinary: Negative. Musculoskeletal: Negative. Skin: Negative. Neurological:  Positive for headaches. Psychiatric/Behavioral: Negative. Prior to Visit Medications    Medication Sig Taking?  Authorizing Provider   Levonorgest-Eth Estrad 91-Day (Stephanie Russell) 0.15-0.03 &0.01 MG TABS TAKE ONE TABLET BY MOUTH DAILY Yes Tin Ortega Federer, DO        No Known Allergies    Past Medical History:   Diagnosis Date    Ovarian cyst, right     Seasonal allergies        Past Surgical History:   Procedure Laterality Date    EPIDURAL STEROID INJECTION Right 1/21/2019    RIGHT LUMBAR FIVE SACRAL ONE EPIDURAL STEROID INJECTION SITE CONFIRMED BY FLUOROSCOPY performed by Lizbeth Krueger MD at [address] History     Socioeconomic History    Marital status: Single     Spouse name: Not on file    Number of children: Not on file    Years of education: Not on file    Highest education level: Not on file   Occupational History    Occupation: student   Tobacco Use    Smoking status: Never    Smokeless tobacco: Never   Vaping Use    Vaping Use: Never used   Substance and Sexual Activity    Alcohol use: No    Drug use: No    Sexual activity: Never   Other Topics Concern    Not on file   Social History Narrative    Not on file     Social Determinants of Health     Financial Resource Strain: Low Risk     Difficulty of Paying Living Expenses: Not hard at all   Food Insecurity: No Food Insecurity    Worried About [address] in the Last Year: Never true    [facility] in the Last Year: Never true   Transportation Needs: No Transportation Needs    Lack of Transportation (Medical): No    Lack of Transportation (Non-Medical): No   Physical Activity: Inactive    Days of Exercise per Week: 0 days    Minutes of Exercise per Session: 0 min   Stress: Not on file   Social Connections: Not on file   Intimate Partner Violence: Not At Risk    Fear of Current or Ex-Partner: No    Emotionally Abused: No    Physically Abused: No    Sexually Abused: No   Housing Stability: Low Risk     Unable to Pay for Housing in the Last Year: No    Number of Places Lived in the Last Year: 1    Unstable Housing in the Last Year: No        Family History   Problem Relation Age of Onset    Allergies Mother     Other Mother         endometriosis    Crohn's Disease Mother     Arthritis Mother     Cancer Maternal Grandmother 59        breast cancer     Thyroid Disease Maternal Grandfather     Heart Disease Maternal Grandfather 62    High Blood Pressure Maternal Grandfather     High Cholesterol Paternal Grandmother     High Blood Pressure Paternal Grandmother     High Cholesterol Paternal Grandfather     High Blood Pressure Paternal Grandfather     High Blood Pressure Father        ADVANCE DIRECTIVE: N, <no information>    Vitals:    07/19/22 0828   BP: 110/70   Site: Right Upper Arm   Position: Sitting   Cuff Size: Medium Adult   Pulse: 85   SpO2: 99%   Weight: 162 lb 6.4 oz (73.7 kg)   Height: 5' 6.75\" (1.695 m)     Estimated body mass index is 25.63 kg/m² as calculated from the following:    Height as of this encounter: 5' 6.75\" (1.695 m). Weight as of this encounter: 162 lb 6.4 oz (73.7 kg). Physical Exam  Vitals reviewed. Constitutional:       Appearance: Normal appearance. She is well-developed. She is not ill-appearing. HENT:      Head: Normocephalic. Eyes:      General: Lids are normal.         Right eye: No discharge. Left eye: No discharge. Neck:      Thyroid: No thyroid mass, thyromegaly or thyroid tenderness. Vascular: Normal carotid pulses. No carotid bruit or JVD. Cardiovascular:      Rate and Rhythm: Normal rate and regular rhythm. Pulses: Normal pulses. Heart sounds: Normal heart sounds. No murmur heard. Pulmonary:      Effort: Pulmonary effort is normal.      Breath sounds: Normal breath sounds. Musculoskeletal:         General: Normal range of motion. Cervical back: Full passive range of motion without pain and normal range of motion. No edema or crepitus. No pain with movement. Normal range of motion. Lymphadenopathy:      Cervical: No cervical adenopathy. Right cervical: No superficial cervical adenopathy. Left cervical: No superficial cervical adenopathy. Skin:     General: Skin is warm and dry. Capillary Refill: Capillary refill takes less than 2 seconds. Neurological:      General: No focal deficit present. Mental Status: She is alert and oriented to person, place, and time. Psychiatric:         Mood and Affect: Mood normal.         Behavior: Behavior normal. Behavior is cooperative. Thought Content: Thought content normal.         Judgment: Judgment normal.       No flowsheet data found. Lab Results   Component Value Date/Time    GLUCOSE 115 09/22/2020 01:19 PM       The ASCVD Risk score (Tyler Councilman., et al., 2013) failed to calculate for the following reasons:     The 2013 ASCVD risk score is only valid for ages 36 to 78    Immunization History   Administered Date(s) Administered    DTP/HiB 01/08/1997, 03/20/1997, 05/24/1997    DTaP 06/15/1998, 09/03/2002    HPV Quadrivalent (Gardasil) 07/23/2014, 09/23/2014, 01/22/2015    Hepatitis B 1995, 01/08/1997, 11/10/1997    Hib, unspecified 02/11/1998    MMR 12/11/1998, 11/05/2001    Meningococcal ACWY Vaccine 05/14/2011    PPD Test 07/07/2015    Polio IPV (IPOL) 01/08/1997, 03/20/1997, 06/15/1998, 09/30/2002    Tdap (Boostrix, Adacel) 05/14/2011       Health Maintenance   Topic Date Due    COVID-19 Vaccine (1) Never done    Varicella vaccine (1 of 2 - 2-dose childhood series) Never done    Depression Screen  Never done    HIV screen  Never done    Chlamydia screen  Never done    Hepatitis C screen  Never done    DTaP/Tdap/Td vaccine (7 - Td or Tdap) 05/14/2021    Flu vaccine (1) 09/01/2022    Pap smear  08/12/2023    Hepatitis B vaccine  Completed    Hib vaccine  Completed    HPV vaccine  Completed    Hepatitis A vaccine  Aged Out    Meningococcal (ACWY) vaccine  Aged Out    Pneumococcal 0-64 years Vaccine  Aged Out       Assessment & Plan     Encounter to establish care    Healthcare maintenance  Encouraged healthy diet and exercise. Recommended routine dental and vision exams. UTD on routine Pap smears.      -     CBC with Auto Differential  -     Comprehensive Metabolic Panel w/ Reflex to MG  -     Hepatitis C Antibody  -     T4, Free  -     TSH    Palpitations  See HPI. Pt has been evaluated for this in 2020. She states that she did try OTC Magnesium, but did not notice a difference so she stopped taking it. Dr. Carlos Diego mentioned starting her on beta blocker. Pt states that she did not f/u with him. Pt also c/o fatigue and cold intolerance. I am not opposed to starting her on a beta blocker, but I would like to check her magnesium level and thyroid function first to see if that is the cause of all of her symptoms. Previous TSH in 2020 was normal.  Advised pt to f/u with Dr. Carlos Diego as previously recommended.       -     Angelo Gay MD, Cardiology, [facility]  -     Comprehensive Metabolic Panel w/ Reflex to MG  -     T4, Free  -     TSH  -     Magnesium    Fatigue, unspecified type  See above.   Will check labs and f/u with pt regarding results and POC.     -     CBC with Auto Differential  -     Comprehensive Metabolic Panel w/ Reflex to MG  -     T4, Free  -     TSH  -     Vitamin D 25 Hydroxy  -     Iron and TIBC  -     Vitamin B12  -     Ferritin    Need for diphtheria-tetanus-pertussis (Tdap) vaccine  -     Tdap, BOOSTRIX, (age 8 yrs+), IM    Return in about 1 year (around 7/19/2023) for Annual Physical.         --LAUREN May - CNP

## 2022-07-20 DIAGNOSIS — E53.8 VITAMIN B12 DEFICIENCY: Primary | ICD-10-CM

## 2022-07-20 PROCEDURE — 96372 THER/PROPH/DIAG INJ SC/IM: CPT | Performed by: NURSE PRACTITIONER

## 2022-07-26 DIAGNOSIS — Z30.011 ENCOUNTER FOR INITIAL PRESCRIPTION OF CONTRACEPTIVE PILLS: ICD-10-CM

## 2022-07-27 RX ORDER — LEVONORGESTREL / ETHINYL ESTRADIOL AND ETHINYL ESTRADIOL 150-30(84)
KIT ORAL
Qty: 91 TABLET | Refills: 1 | Status: SHIPPED | OUTPATIENT
Start: 2022-07-27

## 2022-08-15 ENCOUNTER — NURSE ONLY (OUTPATIENT)
Dept: FAMILY MEDICINE CLINIC | Age: 27
End: 2022-08-15
Payer: COMMERCIAL

## 2022-08-15 DIAGNOSIS — E53.8 VITAMIN B12 DEFICIENCY: Primary | ICD-10-CM

## 2022-08-15 PROCEDURE — 96372 THER/PROPH/DIAG INJ SC/IM: CPT | Performed by: NURSE PRACTITIONER

## 2022-08-15 RX ORDER — CYANOCOBALAMIN 1000 UG/ML
1000 INJECTION INTRAMUSCULAR; SUBCUTANEOUS ONCE
Status: COMPLETED | OUTPATIENT
Start: 2022-08-15 | End: 2022-08-15

## 2022-08-15 RX ADMIN — CYANOCOBALAMIN 1000 MCG: 1000 INJECTION INTRAMUSCULAR; SUBCUTANEOUS at 09:13

## 2022-10-05 ENCOUNTER — NURSE ONLY (OUTPATIENT)
Dept: FAMILY MEDICINE CLINIC | Age: 27
End: 2022-10-05
Payer: COMMERCIAL

## 2022-10-05 DIAGNOSIS — E53.8 VITAMIN B12 DEFICIENCY: ICD-10-CM

## 2022-10-05 DIAGNOSIS — E53.8 VITAMIN B12 DEFICIENCY: Primary | ICD-10-CM

## 2022-10-05 PROCEDURE — 96372 THER/PROPH/DIAG INJ SC/IM: CPT | Performed by: NURSE PRACTITIONER

## 2022-10-05 RX ORDER — CYANOCOBALAMIN 1000 UG/ML
1000 INJECTION INTRAMUSCULAR; SUBCUTANEOUS ONCE
Status: COMPLETED | OUTPATIENT
Start: 2022-10-05 | End: 2022-10-05

## 2022-10-05 RX ADMIN — CYANOCOBALAMIN 1000 MCG: 1000 INJECTION INTRAMUSCULAR; SUBCUTANEOUS at 08:47

## 2022-10-05 NOTE — PROGRESS NOTES
Patient came in to receive her second dose of the B12 injection. Given in L Deltoid. Patient tolerated well.

## 2022-10-12 ENCOUNTER — OFFICE VISIT (OUTPATIENT)
Dept: FAMILY MEDICINE CLINIC | Age: 27
End: 2022-10-12
Payer: COMMERCIAL

## 2022-10-12 VITALS
OXYGEN SATURATION: 98 % | BODY MASS INDEX: 26.52 KG/M2 | HEART RATE: 72 BPM | HEIGHT: 66 IN | WEIGHT: 165 LBS | SYSTOLIC BLOOD PRESSURE: 110 MMHG | DIASTOLIC BLOOD PRESSURE: 72 MMHG

## 2022-10-12 DIAGNOSIS — R53.83 FATIGUE, UNSPECIFIED TYPE: ICD-10-CM

## 2022-10-12 DIAGNOSIS — E53.8 VITAMIN B12 DEFICIENCY: Primary | ICD-10-CM

## 2022-10-12 PROCEDURE — 99213 OFFICE O/P EST LOW 20 MIN: CPT | Performed by: NURSE PRACTITIONER

## 2022-10-12 ASSESSMENT — PATIENT HEALTH QUESTIONNAIRE - PHQ9
8. MOVING OR SPEAKING SO SLOWLY THAT OTHER PEOPLE COULD HAVE NOTICED. OR THE OPPOSITE, BEING SO FIGETY OR RESTLESS THAT YOU HAVE BEEN MOVING AROUND A LOT MORE THAN USUAL: 1
SUM OF ALL RESPONSES TO PHQ QUESTIONS 1-9: 13
SUM OF ALL RESPONSES TO PHQ QUESTIONS 1-9: 13
3. TROUBLE FALLING OR STAYING ASLEEP: 2
SUM OF ALL RESPONSES TO PHQ9 QUESTIONS 1 & 2: 2
SUM OF ALL RESPONSES TO PHQ QUESTIONS 1-9: 13
7. TROUBLE CONCENTRATING ON THINGS, SUCH AS READING THE NEWSPAPER OR WATCHING TELEVISION: 2
1. LITTLE INTEREST OR PLEASURE IN DOING THINGS: 1
SUM OF ALL RESPONSES TO PHQ QUESTIONS 1-9: 13
2. FEELING DOWN, DEPRESSED OR HOPELESS: 1
6. FEELING BAD ABOUT YOURSELF - OR THAT YOU ARE A FAILURE OR HAVE LET YOURSELF OR YOUR FAMILY DOWN: 1
5. POOR APPETITE OR OVEREATING: 2
9. THOUGHTS THAT YOU WOULD BE BETTER OFF DEAD, OR OF HURTING YOURSELF: 0
4. FEELING TIRED OR HAVING LITTLE ENERGY: 3
10. IF YOU CHECKED OFF ANY PROBLEMS, HOW DIFFICULT HAVE THESE PROBLEMS MADE IT FOR YOU TO DO YOUR WORK, TAKE CARE OF THINGS AT HOME, OR GET ALONG WITH OTHER PEOPLE: 1

## 2022-10-12 ASSESSMENT — ANXIETY QUESTIONNAIRES
1. FEELING NERVOUS, ANXIOUS, OR ON EDGE: 2
3. WORRYING TOO MUCH ABOUT DIFFERENT THINGS: 2
IF YOU CHECKED OFF ANY PROBLEMS ON THIS QUESTIONNAIRE, HOW DIFFICULT HAVE THESE PROBLEMS MADE IT FOR YOU TO DO YOUR WORK, TAKE CARE OF THINGS AT HOME, OR GET ALONG WITH OTHER PEOPLE: SOMEWHAT DIFFICULT
GAD7 TOTAL SCORE: 11
4. TROUBLE RELAXING: 1
2. NOT BEING ABLE TO STOP OR CONTROL WORRYING: 2
7. FEELING AFRAID AS IF SOMETHING AWFUL MIGHT HAPPEN: 0
6. BECOMING EASILY ANNOYED OR IRRITABLE: 3
5. BEING SO RESTLESS THAT IT IS HARD TO SIT STILL: 1

## 2022-10-12 NOTE — PROGRESS NOTES
10/12/2022     Melinda Lynn (:  1995) is a 23 y.o. female, here for evaluation of the following medical concerns:    HPI  Diagnosed with vitamin B12 deficiency 23 and was started on injections. Pt c/o extreme fatigue after getting injections. States that she will sleep 3-4 hours after getting them. Denies h/o autoimmune disorders, joint pain, poor diet, IBD, diarrhea, depression or abdominal pain. Review of Systems   All other systems reviewed and are negative. Prior to Visit Medications    Medication Sig Taking? Authorizing Provider   Levonorgest-Eth Estrad 91-Day (Ashish Prabhakar) 0.15-0.03 &0.01 MG TABS TAKE ONE TABLET BY MOUTH DAILY Yes Felicitas Cassidy DO        Social History     Tobacco Use    Smoking status: Never    Smokeless tobacco: Never   Substance Use Topics    Alcohol use: No        Vitals:    10/12/22 1518   BP: 110/72   Site: Right Upper Arm   Position: Sitting   Cuff Size: Medium Adult   Pulse: 72   SpO2: 98%   Weight: 165 lb (74.8 kg)   Height: 5' 6\" (1.676 m)     Estimated body mass index is 26.63 kg/m² as calculated from the following:    Height as of this encounter: 5' 6\" (1.676 m). Weight as of this encounter: 165 lb (74.8 kg). Physical Exam  Vitals reviewed. Constitutional:       Appearance: Normal appearance. Cardiovascular:      Rate and Rhythm: Normal rate and regular rhythm. Pulses: Normal pulses. Heart sounds: Normal heart sounds. Pulmonary:      Effort: Pulmonary effort is normal.      Breath sounds: Normal breath sounds. Neurological:      Mental Status: She is alert and oriented to person, place, and time. ASSESSMENT/PLAN:  1. Vitamin B12 deficiency  Pt states that vitamin B12 injections cause \"extreme fatigue\". No known cause of vitamin B12 deficiency. Will recheck level and refer pt to [facility] for further evaluation and management. - Vitamin B12  - AFL - Booker Muir MD, Oncology, [facility]    2.  Fatigue, unspecified type  See #1.    - Vitamin B12  - CBC with Auto Differential  - Folate; Future  - Iron and TIBC; Future  - Basic Metabolic Panel  - AFL - Marylen Mince, MD, Oncology, [facility]    Return if symptoms worsen or fail to improve. An electronic signature was used to authenticate this note.     --LAUREN Munoz - CNP on 10/12/2022 at 4:29 PM

## 2022-10-13 DIAGNOSIS — E53.8 VITAMIN B12 DEFICIENCY: Primary | ICD-10-CM

## 2022-10-13 LAB
ANION GAP SERPL CALCULATED.3IONS-SCNC: 14 MMOL/L (ref 3–16)
BASOPHILS ABSOLUTE: 0.1 K/UL (ref 0–0.2)
BASOPHILS RELATIVE PERCENT: 1 %
BUN BLDV-MCNC: 9 MG/DL (ref 7–20)
CALCIUM SERPL-MCNC: 9.5 MG/DL (ref 8.3–10.6)
CHLORIDE BLD-SCNC: 103 MMOL/L (ref 99–110)
CO2: 23 MMOL/L (ref 21–32)
CREAT SERPL-MCNC: 0.9 MG/DL (ref 0.6–1.1)
EOSINOPHILS ABSOLUTE: 0.1 K/UL (ref 0–0.6)
EOSINOPHILS RELATIVE PERCENT: 2.1 %
FOLATE: 15.53 NG/ML (ref 4.78–24.2)
GFR AFRICAN AMERICAN: >60
GFR NON-AFRICAN AMERICAN: >60
GLUCOSE BLD-MCNC: 88 MG/DL (ref 70–99)
HCT VFR BLD CALC: 37.2 % (ref 36–48)
HEMOGLOBIN: 12.7 G/DL (ref 12–16)
IRON SATURATION: 25 % (ref 15–50)
IRON: 108 UG/DL (ref 37–145)
LYMPHOCYTES ABSOLUTE: 1 K/UL (ref 1–5.1)
LYMPHOCYTES RELATIVE PERCENT: 19.3 %
MCH RBC QN AUTO: 30.5 PG (ref 26–34)
MCHC RBC AUTO-ENTMCNC: 34.2 G/DL (ref 31–36)
MCV RBC AUTO: 89.2 FL (ref 80–100)
MONOCYTES ABSOLUTE: 0.3 K/UL (ref 0–1.3)
MONOCYTES RELATIVE PERCENT: 6.1 %
NEUTROPHILS ABSOLUTE: 3.6 K/UL (ref 1.7–7.7)
NEUTROPHILS RELATIVE PERCENT: 71.5 %
PDW BLD-RTO: 12.8 % (ref 12.4–15.4)
PLATELET # BLD: 240 K/UL (ref 135–450)
PMV BLD AUTO: 9.6 FL (ref 5–10.5)
POTASSIUM SERPL-SCNC: 4.3 MMOL/L (ref 3.5–5.1)
RBC # BLD: 4.18 M/UL (ref 4–5.2)
SODIUM BLD-SCNC: 140 MMOL/L (ref 136–145)
TOTAL IRON BINDING CAPACITY: 440 UG/DL (ref 260–445)
VITAMIN B-12: 330 PG/ML (ref 211–911)
WBC # BLD: 5.1 K/UL (ref 4–11)

## 2022-10-13 RX ORDER — LANOLIN ALCOHOL/MO/W.PET/CERES
1000 CREAM (GRAM) TOPICAL DAILY
Qty: 30 TABLET | Refills: 3 | Status: SHIPPED | OUTPATIENT
Start: 2022-10-13

## 2022-10-25 ENCOUNTER — TELEPHONE (OUTPATIENT)
Dept: FAMILY MEDICINE CLINIC | Age: 27
End: 2022-10-25

## 2022-10-25 NOTE — TELEPHONE ENCOUNTER
LM for patient to call back. Please give her Cheryle's message below     Tameka Son, APRN - CNP  [facility] Support  I just rec'd this note from one of the NP's at [facility] regarding a positive ANT result. Can you ask them to send us the lab results and help the pt to schedule a f/u appt with me? VV is ok. Thank you.

## 2022-10-26 LAB
BASOPHILS ABSOLUTE: 0.03 /ΜL
BASOPHILS RELATIVE PERCENT: 0.6 %
EOSINOPHILS ABSOLUTE: 0.09 /ΜL
EOSINOPHILS RELATIVE PERCENT: 1.7 %
HCT VFR BLD CALC: 37.9 % (ref 36–46)
HEMOGLOBIN: 12.6 G/DL (ref 12–16)
LYMPHOCYTES ABSOLUTE: 1.04 /ΜL
LYMPHOCYTES RELATIVE PERCENT: 19.9 %
MCH RBC QN AUTO: 30.4 PG
MCHC RBC AUTO-ENTMCNC: 33.2 G/DL
MCV RBC AUTO: 91.3 FL
MONOCYTES ABSOLUTE: 0.37 /ΜL
MONOCYTES RELATIVE PERCENT: 7.1 %
NEUTROPHILS ABSOLUTE: 3.7 /ΜL
NEUTROPHILS RELATIVE PERCENT: 70.7 %
PDW BLD-RTO: 12.2 %
PLATELET # BLD: 247 K/ΜL
PMV BLD AUTO: ABNORMAL FL
RBC # BLD: 1.15 10^6/ΜL
WBC # BLD: 5.2 10^3/ML

## 2022-10-28 ENCOUNTER — OFFICE VISIT (OUTPATIENT)
Dept: FAMILY MEDICINE CLINIC | Age: 27
End: 2022-10-28
Payer: COMMERCIAL

## 2022-10-28 VITALS
WEIGHT: 166.2 LBS | HEART RATE: 74 BPM | HEIGHT: 67 IN | SYSTOLIC BLOOD PRESSURE: 124 MMHG | OXYGEN SATURATION: 98 % | BODY MASS INDEX: 26.09 KG/M2 | DIASTOLIC BLOOD PRESSURE: 82 MMHG

## 2022-10-28 DIAGNOSIS — R76.8 POSITIVE ANA (ANTINUCLEAR ANTIBODY): Primary | ICD-10-CM

## 2022-10-28 PROCEDURE — 99212 OFFICE O/P EST SF 10 MIN: CPT | Performed by: NURSE PRACTITIONER

## 2022-10-28 ASSESSMENT — PATIENT HEALTH QUESTIONNAIRE - PHQ9
SUM OF ALL RESPONSES TO PHQ QUESTIONS 1-9: 14
8. MOVING OR SPEAKING SO SLOWLY THAT OTHER PEOPLE COULD HAVE NOTICED. OR THE OPPOSITE, BEING SO FIGETY OR RESTLESS THAT YOU HAVE BEEN MOVING AROUND A LOT MORE THAN USUAL: 1
SUM OF ALL RESPONSES TO PHQ QUESTIONS 1-9: 14
7. TROUBLE CONCENTRATING ON THINGS, SUCH AS READING THE NEWSPAPER OR WATCHING TELEVISION: 2
6. FEELING BAD ABOUT YOURSELF - OR THAT YOU ARE A FAILURE OR HAVE LET YOURSELF OR YOUR FAMILY DOWN: 1
10. IF YOU CHECKED OFF ANY PROBLEMS, HOW DIFFICULT HAVE THESE PROBLEMS MADE IT FOR YOU TO DO YOUR WORK, TAKE CARE OF THINGS AT HOME, OR GET ALONG WITH OTHER PEOPLE: 1
9. THOUGHTS THAT YOU WOULD BE BETTER OFF DEAD, OR OF HURTING YOURSELF: 0
2. FEELING DOWN, DEPRESSED OR HOPELESS: 2
4. FEELING TIRED OR HAVING LITTLE ENERGY: 3
SUM OF ALL RESPONSES TO PHQ9 QUESTIONS 1 & 2: 4
1. LITTLE INTEREST OR PLEASURE IN DOING THINGS: 2
3. TROUBLE FALLING OR STAYING ASLEEP: 2
SUM OF ALL RESPONSES TO PHQ QUESTIONS 1-9: 14
SUM OF ALL RESPONSES TO PHQ QUESTIONS 1-9: 14
5. POOR APPETITE OR OVEREATING: 1

## 2022-10-28 ASSESSMENT — ANXIETY QUESTIONNAIRES
IF YOU CHECKED OFF ANY PROBLEMS ON THIS QUESTIONNAIRE, HOW DIFFICULT HAVE THESE PROBLEMS MADE IT FOR YOU TO DO YOUR WORK, TAKE CARE OF THINGS AT HOME, OR GET ALONG WITH OTHER PEOPLE: SOMEWHAT DIFFICULT
1. FEELING NERVOUS, ANXIOUS, OR ON EDGE: 2
GAD7 TOTAL SCORE: 11
4. TROUBLE RELAXING: 1
5. BEING SO RESTLESS THAT IT IS HARD TO SIT STILL: 1
2. NOT BEING ABLE TO STOP OR CONTROL WORRYING: 1
7. FEELING AFRAID AS IF SOMETHING AWFUL MIGHT HAPPEN: 1
3. WORRYING TOO MUCH ABOUT DIFFERENT THINGS: 2
6. BECOMING EASILY ANNOYED OR IRRITABLE: 3

## 2022-10-28 NOTE — PROGRESS NOTES
10/28/2022     Melinda Lynn (:  1995) is a 23 y.o. female, here for evaluation of the following medical concerns:    HPI    Pt here today to discuss positive ANT result from [facility]. She was referred to [facility] for vitamin B12 deficiency and increased fatigue with B12 injections. She was told that her ANT was positive and to f/u with her PCP. Denies h/o autoimmune disorders, joint pain, poor diet, IBD, diarrhea, depression or abdominal pain. Her only c/o are fatigue and cold intolerance. See recent lab results. Review of Systems   All other systems reviewed and are negative. Prior to Visit Medications    Medication Sig Taking? Authorizing Provider   vitamin B-12 (CYANOCOBALAMIN) 1000 MCG tablet Take 1 tablet by mouth daily Yes Sonya Gordon APRN - CNP   Levonorgest-Eth Estrad 91-Day (Michael Her) 0.15-0.03 &0.01 MG TABS TAKE ONE TABLET BY MOUTH DAILY Yes Yulia Trivedi DO        Social History     Tobacco Use    Smoking status: Never    Smokeless tobacco: Never   Substance Use Topics    Alcohol use: No        Vitals:    10/28/22 1554   BP: 124/82   Site: Right Upper Arm   Position: Sitting   Cuff Size: Medium Adult   Pulse: 74   SpO2: 98%   Weight: 166 lb 3.2 oz (75.4 kg)   Height: 5' 7\" (1.702 m)     Estimated body mass index is 26.03 kg/m² as calculated from the following:    Height as of this encounter: 5' 7\" (1.702 m). Weight as of this encounter: 166 lb 3.2 oz (75.4 kg). Physical Exam  Vitals reviewed. Constitutional:       Appearance: Normal appearance. Pulmonary:      Effort: Pulmonary effort is normal.   Neurological:      Mental Status: She is alert and oriented to person, place, and time. Psychiatric:         Mood and Affect: Mood normal.         Behavior: Behavior normal.         Thought Content: Thought content normal.         Judgment: Judgment normal.       ASSESSMENT/PLAN:  1.  Positive ANT (antinuclear antibody)  F/u with Rheumatology for further evaluation.  - Fernanda Rodriguez MD, Rheumatology, [facility]    Return if symptoms worsen or fail to improve. An electronic signature was used to authenticate this note.     --LAUREN Shaffer - CNP on 10/28/2022 at 4:19 PM

## 2022-11-09 NOTE — PROGRESS NOTES
Arti Douglas MD                                                [address] [place] [address] [phone] (P) [phone] (F)      Note is transcribed using voice recognition software. Inadvertent computerized transcription errors may be present. Patient identification: Lizz Juan, female : 1995,27 y.o.    ASSESSMENT AND PLAN:  Riki Nguyen was seen today for establish care and abnormal test results. Diagnoses and all orders for this visit:    Positive ANT (antinuclear antibody)    Other fatigue  -     Dc Mcgowan MD, Sleep Medicine, [facility] Wlaly Polo MD, Sleep Medicine, [facility]    Positive ANT  Weakly positive ANT, likely false positive. At this time patient does not have symptoms concerning for autoimmune/rheumatic disease. No further workup/follow up with Rheumatology necessary at this time. Patient made aware she is able to reach out for appointment in case new concerning symptoms develop. Chronic fatigue and Hypersomnolence    There is minimal evidence that these concerns are related to any underlying rheumatic disorder. Consider underlying sleep disorder. Recommend further evaluation with sleep specialist, referral made at this visit. Patient has element of chronic fatigue, etiology unknown. I feel that addressing her sleep issues may help with this. If patient does not have underlying sleep disorder, and symptoms are persisting, might benefit from low-dose Cymbalta. Patient indicates understanding and agrees with the management plan.     #################################################################################################      REASON FOR CONSULTATION:  Patient is being seen at the request of  LAUREN Garvin - MELQUIADES / LAUREN Ornelas -*    HISTORY OF PRESENT ILLNESS:  33 y.o. female with no significant PMHx presented for new patient appointment. Patient was referred by both primary care and Heme/Onc at [facility] for positive ANT on lab work in light of chronic fatigue. Per patient she states she has experienced fatigue for as long as she can remember. She also endorses that she sleeps 9-10 hours a night however does not feel refreshed in the morning. She does not feel that she tosses and turns or lays awake for long periods throughout the night. She does endorse excessive daytime sleepiness. She also does endorse some global whole-body aching that worsens throughout the day. She specifically denies noting any swollen joints. PCP ran B12, Folate and found B12 to be low. Patient started B12 injections however experienced even more fatigue with the injections. Of note she works as a , she is on her feet for the majority of the day. She does not exercise regularly as she is unable to find the time to. Pertinent ROS: Denies weight loss, objective fever, skin rashes or skin thickening, photosensitivity Raynauds, focal alopecia, recurrent thank you congestion, dry eyes or mouth, or mucosal ulcers, tinnitus or recent hearing loss. Denies chest pain, palpitations, cough, pleurisy, dysphagia, or features of inflammatory bowel diseases. No h/o blood clots or bleeding disorders. No renal or genitourinary problems. No focal weakness or persistent paresthesia. All other ROS are negative. PMH, PSH, Social history, Meds reviewed. FH - RA in Mother. PHYSICAL EXAM:    Vitals:    /76   Ht 5' 7\" (1.702 m)   Wt 167 lb (75.8 kg)   BMI 26.16 kg/m²   AA)x3 well nourished, and well groomed, normal judgement. MSK: normal [address] exam.  Skin: No rashes, no induration or skin thickening or nodules. HEENT: Normal lids, lacrimal glands and pupils.  No oral or nasal ulcers. Salivary glands reveal no evidence of abnormality. External inspection of the ears and nose within normal limits. Neck: Supple no adenopathy  Chest: Normal effort. Heart:    Abdomen:   Lymph nodes:   Neurologic:     DATA:   ANA1:80  Lab Results   Component Value Date    WBC 5.2 10/19/2022    HGB 12.6 10/19/2022    HCT 37.9 10/19/2022    MCV 91.3 10/19/2022    .0 10/19/2022     Lab Results   Component Value Date     10/12/2022    K 4.3 10/12/2022     10/12/2022    CO2 23 10/12/2022    BUN 9 10/12/2022    CREATININE 0.9 10/12/2022    GLUCOSE 88 10/12/2022    CALCIUM 9.5 10/12/2022    PROT 7.1 07/19/2022    LABALBU 4.7 07/19/2022    BILITOT 0.5 07/19/2022    ALKPHOS 33 (L) 07/19/2022    AST 13 (L) 07/19/2022    ALT 11 07/19/2022    LABGLOM >60 10/12/2022    GFRAA >60 10/12/2022    AGRATIO 2.0 07/19/2022    GLOB 2.7 09/22/2020           Total time 60 minutes that includes the following -    Preparing to see the patient such as reviewing patients records, pre-charting, preparing the visit on the same day, performing a medically appropriate history and physical examination, counseling and educating patient about diagnosis, management plan, ordering appropriate testings, prescriptions, communicating findings to other care providers, and documenting clinical information in electronic medical record. I thank you for giving me the opportunity to be involved in Melinda Lynn's care and I look forward following Melinda along with you. If you have any questions or concerns please feel free to contact me at any time.     Mikayla Colunga MD 11/10/22

## 2022-11-10 ENCOUNTER — TELEPHONE (OUTPATIENT)
Dept: INTERNAL MEDICINE CLINIC | Age: 27
End: 2022-11-10

## 2022-11-10 ENCOUNTER — OFFICE VISIT (OUTPATIENT)
Dept: RHEUMATOLOGY | Age: 27
End: 2022-11-10
Payer: COMMERCIAL

## 2022-11-10 VITALS
DIASTOLIC BLOOD PRESSURE: 76 MMHG | SYSTOLIC BLOOD PRESSURE: 120 MMHG | HEIGHT: 67 IN | BODY MASS INDEX: 26.21 KG/M2 | WEIGHT: 167 LBS

## 2022-11-10 DIAGNOSIS — R76.8 POSITIVE ANA (ANTINUCLEAR ANTIBODY): Primary | ICD-10-CM

## 2022-11-10 DIAGNOSIS — G47.10 HYPERSOMNOLENCE: ICD-10-CM

## 2022-11-10 DIAGNOSIS — R53.83 OTHER FATIGUE: ICD-10-CM

## 2022-11-10 PROCEDURE — 99204 OFFICE O/P NEW MOD 45 MIN: CPT | Performed by: INTERNAL MEDICINE

## 2022-11-26 ENCOUNTER — PATIENT MESSAGE (OUTPATIENT)
Dept: FAMILY MEDICINE CLINIC | Age: 27
End: 2022-11-26

## 2023-01-16 ENCOUNTER — TELEPHONE (OUTPATIENT)
Dept: FAMILY MEDICINE CLINIC | Age: 28
End: 2023-01-16

## 2023-02-16 ENCOUNTER — OFFICE VISIT (OUTPATIENT)
Dept: FAMILY MEDICINE CLINIC | Age: 28
End: 2023-02-16
Payer: COMMERCIAL

## 2023-02-16 VITALS
DIASTOLIC BLOOD PRESSURE: 76 MMHG | SYSTOLIC BLOOD PRESSURE: 118 MMHG | TEMPERATURE: 98.4 F | HEART RATE: 74 BPM | OXYGEN SATURATION: 98 %

## 2023-02-16 DIAGNOSIS — H66.002 NON-RECURRENT ACUTE SUPPURATIVE OTITIS MEDIA OF LEFT EAR WITHOUT SPONTANEOUS RUPTURE OF TYMPANIC MEMBRANE: Primary | ICD-10-CM

## 2023-02-16 PROCEDURE — 1036F TOBACCO NON-USER: CPT | Performed by: NURSE PRACTITIONER

## 2023-02-16 PROCEDURE — G8427 DOCREV CUR MEDS BY ELIG CLIN: HCPCS | Performed by: NURSE PRACTITIONER

## 2023-02-16 PROCEDURE — 99213 OFFICE O/P EST LOW 20 MIN: CPT | Performed by: NURSE PRACTITIONER

## 2023-02-16 PROCEDURE — G8419 CALC BMI OUT NRM PARAM NOF/U: HCPCS | Performed by: NURSE PRACTITIONER

## 2023-02-16 PROCEDURE — G8484 FLU IMMUNIZE NO ADMIN: HCPCS | Performed by: NURSE PRACTITIONER

## 2023-02-16 RX ORDER — CIPROFLOXACIN AND DEXAMETHASONE 3; 1 MG/ML; MG/ML
4 SUSPENSION/ DROPS AURICULAR (OTIC) 2 TIMES DAILY
Qty: 7.5 ML | Refills: 0 | Status: SHIPPED
Start: 2023-02-16 | End: 2023-02-17

## 2023-02-16 RX ORDER — AMOXICILLIN AND CLAVULANATE POTASSIUM 875; 125 MG/1; MG/1
1 TABLET, FILM COATED ORAL 2 TIMES DAILY
Qty: 20 TABLET | Refills: 0 | Status: SHIPPED | OUTPATIENT
Start: 2023-02-16 | End: 2023-02-26

## 2023-02-16 ASSESSMENT — PATIENT HEALTH QUESTIONNAIRE - PHQ9
1. LITTLE INTEREST OR PLEASURE IN DOING THINGS: 1
4. FEELING TIRED OR HAVING LITTLE ENERGY: 1
2. FEELING DOWN, DEPRESSED OR HOPELESS: 1
10. IF YOU CHECKED OFF ANY PROBLEMS, HOW DIFFICULT HAVE THESE PROBLEMS MADE IT FOR YOU TO DO YOUR WORK, TAKE CARE OF THINGS AT HOME, OR GET ALONG WITH OTHER PEOPLE: 1
7. TROUBLE CONCENTRATING ON THINGS, SUCH AS READING THE NEWSPAPER OR WATCHING TELEVISION: 0
8. MOVING OR SPEAKING SO SLOWLY THAT OTHER PEOPLE COULD HAVE NOTICED. OR THE OPPOSITE, BEING SO FIGETY OR RESTLESS THAT YOU HAVE BEEN MOVING AROUND A LOT MORE THAN USUAL: 0
3. TROUBLE FALLING OR STAYING ASLEEP: 2
9. THOUGHTS THAT YOU WOULD BE BETTER OFF DEAD, OR OF HURTING YOURSELF: 0
SUM OF ALL RESPONSES TO PHQ QUESTIONS 1-9: 7
SUM OF ALL RESPONSES TO PHQ QUESTIONS 1-9: 7
6. FEELING BAD ABOUT YOURSELF - OR THAT YOU ARE A FAILURE OR HAVE LET YOURSELF OR YOUR FAMILY DOWN: 1
SUM OF ALL RESPONSES TO PHQ QUESTIONS 1-9: 7
5. POOR APPETITE OR OVEREATING: 1
SUM OF ALL RESPONSES TO PHQ9 QUESTIONS 1 & 2: 2
SUM OF ALL RESPONSES TO PHQ QUESTIONS 1-9: 7

## 2023-02-16 ASSESSMENT — ENCOUNTER SYMPTOMS
WHEEZING: 0
CONSTIPATION: 0
COUGH: 0
CHEST TIGHTNESS: 0
NAUSEA: 0
VOMITING: 0
FACIAL SWELLING: 0
DIARRHEA: 0
SINUS PRESSURE: 0
SHORTNESS OF BREATH: 0
SORE THROAT: 0
SINUS PAIN: 0

## 2023-02-16 ASSESSMENT — ANXIETY QUESTIONNAIRES
IF YOU CHECKED OFF ANY PROBLEMS ON THIS QUESTIONNAIRE, HOW DIFFICULT HAVE THESE PROBLEMS MADE IT FOR YOU TO DO YOUR WORK, TAKE CARE OF THINGS AT HOME, OR GET ALONG WITH OTHER PEOPLE: SOMEWHAT DIFFICULT
4. TROUBLE RELAXING: 1
7. FEELING AFRAID AS IF SOMETHING AWFUL MIGHT HAPPEN: 1
3. WORRYING TOO MUCH ABOUT DIFFERENT THINGS: 2
2. NOT BEING ABLE TO STOP OR CONTROL WORRYING: 2
5. BEING SO RESTLESS THAT IT IS HARD TO SIT STILL: 1
GAD7 TOTAL SCORE: 13
6. BECOMING EASILY ANNOYED OR IRRITABLE: 3
1. FEELING NERVOUS, ANXIOUS, OR ON EDGE: 3

## 2023-02-16 NOTE — PROGRESS NOTES
2023  Melindacristiana Lynn (: 1995)  33 y.o.    ASSESSMENT and PLAN:  Eli Davidson was seen today for otalgia. Diagnoses and all orders for this visit:    Non-recurrent acute suppurative otitis media of left ear without spontaneous rupture of tympanic membrane  -treat with antibiotics for otitis media, cannot rule out otitis externa will cover with topical due to inflammation/irritation, erythema.   -use and s/e reviewed. -dry ear protocols, keep ears from getting wet. Instructions for ear care given via AVS.   -alarm symptoms discussed. Return if no improvement in 2-3 days or worsening. Return if symptoms worsen or fail to improve. HPI  Presenting with Bilateral ear discomfort L>R  Symptom onset Monday. Associated symptoms include ears painful L>R unable to lay on left side, itchy, fullness bilaterally. Muffled hearing on left. Does not clean ears, hasn't put anything in ears. Denies headache, sore throat, cough, congestion. No recent URI  No recent travel. Denies drainage, hearing loss. denies n/v/d  Treatments tried none. Denies ringing. Denies hearing loss. Has not experienced this before  Denies fever, chills, body aches. Denies tinnitus. Review of Systems   Constitutional:  Negative for activity change, appetite change, chills, diaphoresis, fatigue, fever and unexpected weight change. HENT:  Positive for ear pain. Negative for congestion, ear discharge, facial swelling, sinus pressure, sinus pain and sore throat. Respiratory:  Negative for cough, chest tightness, shortness of breath and wheezing. Cardiovascular:  Negative for chest pain, palpitations and leg swelling. Gastrointestinal:  Negative for constipation, diarrhea, nausea and vomiting. Genitourinary: Negative. Negative for difficulty urinating and dysuria. Musculoskeletal: Negative. Negative for gait problem. Neurological: Negative.   Negative for dizziness, syncope, weakness, light-headedness, numbness and headaches. Psychiatric/Behavioral: Negative. Allergies, past medical history, family history, and social history reviewed and unchanged from previous encounter. Current Outpatient Medications   Medication Sig Dispense Refill    vitamin B-12 (CYANOCOBALAMIN) 1000 MCG tablet Take 1 tablet by mouth daily 30 tablet 3    Levonorgest-Eth Estrad 91-Day (DAYSEE) 0.15-0.03 &0.01 MG TABS TAKE ONE TABLET BY MOUTH DAILY 91 tablet 1     No current facility-administered medications for this visit. Vitals:    02/16/23 1516   BP: 118/76   Site: Right Upper Arm   Position: Sitting   Cuff Size: Medium Adult   Pulse: 74   Temp: 98.4 °F (36.9 °C)   TempSrc: Oral   SpO2: 98%     Estimated body mass index is 26.16 kg/m² as calculated from the following:    Height as of 11/10/22: 5' 7\" (1.702 m). Weight as of 11/10/22: 167 lb (75.8 kg). Physical Exam  Vitals reviewed. Constitutional:       General: She is not in acute distress. Appearance: Normal appearance. She is not ill-appearing, toxic-appearing or diaphoretic. HENT:      Head: Normocephalic and atraumatic. Right Ear: No decreased hearing noted. No laceration. Tympanic membrane is erythematous. Left Ear: No decreased hearing noted. Tenderness present. No laceration. No foreign body. Tympanic membrane is erythematous and bulging. Tympanic membrane is not scarred or perforated. Ears:      Comments: Erythema in bilateral canals with inflammation. Nose: Nose normal. No congestion or rhinorrhea. Mouth/Throat:      Mouth: Mucous membranes are moist.      Pharynx: Oropharynx is clear. No oropharyngeal exudate or posterior oropharyngeal erythema. Eyes:      Extraocular Movements: Extraocular movements intact. Conjunctiva/sclera: Conjunctivae normal.      Pupils: Pupils are equal, round, and reactive to light. Cardiovascular:      Rate and Rhythm: Normal rate and regular rhythm. Pulses: Normal pulses. Heart sounds: Normal heart sounds. Abdominal:      General: Abdomen is flat. Palpations: Abdomen is soft. Musculoskeletal:         General: Normal range of motion. Cervical back: Normal range of motion and neck supple. Skin:     General: Skin is warm and dry. Capillary Refill: Capillary refill takes less than 2 seconds. Neurological:      General: No focal deficit present. Mental Status: She is alert and oriented to person, place, and time. Mental status is at baseline. Psychiatric:         Mood and Affect: Mood normal.         Behavior: Behavior normal.         Thought Content:  Thought content normal.         Judgment: Judgment normal.

## 2023-07-09 SDOH — HEALTH STABILITY: PHYSICAL HEALTH: ON AVERAGE, HOW MANY DAYS PER WEEK DO YOU ENGAGE IN MODERATE TO STRENUOUS EXERCISE (LIKE A BRISK WALK)?: 3 DAYS

## 2023-07-09 SDOH — HEALTH STABILITY: PHYSICAL HEALTH: ON AVERAGE, HOW MANY MINUTES DO YOU ENGAGE IN EXERCISE AT THIS LEVEL?: 40 MIN

## 2023-07-10 ENCOUNTER — OFFICE VISIT (OUTPATIENT)
Dept: PRIMARY CARE CLINIC | Age: 28
End: 2023-07-10
Payer: COMMERCIAL

## 2023-07-10 VITALS
BODY MASS INDEX: 24.34 KG/M2 | DIASTOLIC BLOOD PRESSURE: 76 MMHG | HEIGHT: 68 IN | WEIGHT: 160.6 LBS | HEART RATE: 80 BPM | OXYGEN SATURATION: 99 % | SYSTOLIC BLOOD PRESSURE: 114 MMHG | TEMPERATURE: 98.5 F

## 2023-07-10 DIAGNOSIS — E53.8 B12 DEFICIENCY: ICD-10-CM

## 2023-07-10 DIAGNOSIS — G47.10 HYPERSOMNOLENCE: ICD-10-CM

## 2023-07-10 DIAGNOSIS — R53.82 CHRONIC FATIGUE: ICD-10-CM

## 2023-07-10 DIAGNOSIS — R53.82 CHRONIC FATIGUE: Primary | ICD-10-CM

## 2023-07-10 LAB
CRP SERPL-MCNC: <3 MG/L (ref 0–5.1)
ERYTHROCYTE [SEDIMENTATION RATE] IN BLOOD BY WESTERGREN METHOD: 2 MM/HR (ref 0–20)
FOLATE SERPL-MCNC: 11.13 NG/ML (ref 4.78–24.2)
RHEUMATOID FACT SER IA-ACNC: <10 IU/ML
VIT B12 SERPL-MCNC: 245 PG/ML (ref 211–911)

## 2023-07-10 PROCEDURE — 1036F TOBACCO NON-USER: CPT | Performed by: NURSE PRACTITIONER

## 2023-07-10 PROCEDURE — G8427 DOCREV CUR MEDS BY ELIG CLIN: HCPCS | Performed by: NURSE PRACTITIONER

## 2023-07-10 PROCEDURE — 99204 OFFICE O/P NEW MOD 45 MIN: CPT | Performed by: NURSE PRACTITIONER

## 2023-07-10 PROCEDURE — G8420 CALC BMI NORM PARAMETERS: HCPCS | Performed by: NURSE PRACTITIONER

## 2023-07-10 ASSESSMENT — ENCOUNTER SYMPTOMS
ABDOMINAL PAIN: 0
SORE THROAT: 0
SHORTNESS OF BREATH: 0

## 2023-07-10 NOTE — PROGRESS NOTES
[facility]. PRIMARY CARE  [address] [facility] [address]  Dept: [phone]  Dept Fax: [phone]     7/10/2023      Marlene Major   1995     Chief Complaint   Patient presents with    New Patient     Prolonged fatigue ,establishing care       HPI     Patient presents to get established. Wants to discuss. States she has had chronic fatigue for years. Her previous provider left her practice who started the work-up. She was found to have low Vit B12. She didn't tolerate the B12 injections but does take daily B12 tablets. She states she is very fatigued daily; has to fight to stay awake at work. Difficult to do day-to-day things. Takes a lot of naps. Sleeps 8-9 hours a night but wakes up feeling exhausted. Body feels sore when she wakes up. ANT screen was abnormal last year. States she saw a rheumatologist that told her was likely a false positive and recommended she follow up with sleep specialist, which she has not yet done. PHQ Scores 2/16/2023 10/28/2022 10/12/2022 7/19/2022 7/23/2014   PHQ2 Score 2 4 2 0 0   PHQ9 Score 7 14 13 0 0     Interpretation of Total Score Depression Severity: 1-4 = Minimal depression, 5-9 = Mild depression, 10-14 = Moderate depression, 15-19 = Moderately severe depression, 20-27 = Severe depression     Prior to Visit Medications    Medication Sig Taking?  Authorizing Provider   vitamin B-12 (CYANOCOBALAMIN) 1000 MCG tablet Take 1 tablet by mouth daily Yes LAUREN Stewart CNP   Levonorgest-Eth Estrad 91-Day [facility]) 0.15-0.03 &0.01 MG TABS TAKE ONE TABLET BY MOUTH DAILY Yes Colin Condon DO       Past Medical History:   Diagnosis Date    Ovarian cyst, right     Seasonal allergies         Social History     Tobacco Use    Smoking status: Never    Smokeless tobacco: Never   Vaping Use    Vaping Use: Never used   Substance Use Topics    Alcohol use: No    Drug use: No        Past Surgical History:   Procedure

## 2023-07-11 LAB
ANA SER QL IA: POSITIVE
CCP IGG SERPL-ACNC: <0.5 U/ML (ref 0–2.9)
CENTROMERE B IGG SER QL LINE BLOT: <0.2 AI (ref 0–0.9)
CHROMATIN AB SERPL-ACNC: 1.3 AI (ref 0–0.9)
DSDNA AB SER-ACNC: <1 IU/ML (ref 0–9)
ENA JO1 AB SER IA-ACNC: <0.2 AI (ref 0–0.9)
ENA RNP AB SER IA-ACNC: <0.2 AI (ref 0–0.9)
ENA SCL70 IGG SER IA-ACNC: 0.6 AI (ref 0–0.9)
ENA SM AB SER IA-ACNC: <0.2 AI (ref 0–0.9)
ENA SM+RNP IGG SER-ACNC: <0.2 AI (ref 0–0.9)
ENA SS-A AB SER IA-ACNC: 0.4 AI (ref 0–0.9)
ENA SS-B AB SER IA-ACNC: <0.2 AI (ref 0–0.9)
RIBOSOMAL P AB SER IA-ACNC: <0.2 AI (ref 0–0.9)

## 2023-07-12 DIAGNOSIS — R76.8 POSITIVE ANA (ANTINUCLEAR ANTIBODY): Primary | ICD-10-CM

## 2023-07-12 NOTE — RESULT ENCOUNTER NOTE
Your ANT was abnormal again. Due to your symptoms and lab results, I would like you to see another rheumatologist to get their opinion. I have placed a referral and here is the info.      [address] MD Vidhya, MPH  [address] [place] [address], [facility] [address]  Phone: [phone]

## 2023-07-13 LAB
ANA PATTERN: ABNORMAL
ANA TITER: ABNORMAL
ANTINUCLEAR AB INTERPRETIVE COMMENT: ABNORMAL
NUCLEAR IGG SER QL IF: DETECTED

## 2023-07-17 DIAGNOSIS — Z79.3 ON ORAL CONTRACEPTIVE PILLS FOR NON-CONTRACEPTION INDICATION: Primary | ICD-10-CM

## 2023-07-17 DIAGNOSIS — Z30.011 ENCOUNTER FOR INITIAL PRESCRIPTION OF CONTRACEPTIVE PILLS: ICD-10-CM

## 2023-07-17 RX ORDER — LEVONORGESTREL / ETHINYL ESTRADIOL AND ETHINYL ESTRADIOL 150-30(84)
KIT ORAL
Qty: 91 TABLET | Refills: 1 | Status: SHIPPED | OUTPATIENT
Start: 2023-07-17

## 2023-08-16 ENCOUNTER — OFFICE VISIT (OUTPATIENT)
Dept: PULMONOLOGY | Age: 28
End: 2023-08-16
Payer: COMMERCIAL

## 2023-08-16 VITALS
HEART RATE: 58 BPM | WEIGHT: 161 LBS | HEIGHT: 68 IN | OXYGEN SATURATION: 98 % | SYSTOLIC BLOOD PRESSURE: 120 MMHG | TEMPERATURE: 98.2 F | DIASTOLIC BLOOD PRESSURE: 80 MMHG | RESPIRATION RATE: 16 BRPM | BODY MASS INDEX: 24.4 KG/M2

## 2023-08-16 DIAGNOSIS — G47.33 OSA (OBSTRUCTIVE SLEEP APNEA): Primary | ICD-10-CM

## 2023-08-16 DIAGNOSIS — G47.411 CATAPLEXY: ICD-10-CM

## 2023-08-16 DIAGNOSIS — G47.10 HYPERSOMNOLENCE: ICD-10-CM

## 2023-08-16 DIAGNOSIS — F45.8 BRUXISM: ICD-10-CM

## 2023-08-16 DIAGNOSIS — R53.83 FATIGUE, UNSPECIFIED TYPE: ICD-10-CM

## 2023-08-16 DIAGNOSIS — R76.8 ANA POSITIVE: ICD-10-CM

## 2023-08-16 PROBLEM — D53.1 MEGALOBLASTIC ANEMIA DUE TO VITAMIN B12 DEFICIENCY: Status: ACTIVE | Noted: 2023-08-16

## 2023-08-16 PROCEDURE — G8427 DOCREV CUR MEDS BY ELIG CLIN: HCPCS | Performed by: NURSE PRACTITIONER

## 2023-08-16 PROCEDURE — 99203 OFFICE O/P NEW LOW 30 MIN: CPT | Performed by: NURSE PRACTITIONER

## 2023-08-16 PROCEDURE — 1036F TOBACCO NON-USER: CPT | Performed by: NURSE PRACTITIONER

## 2023-08-16 PROCEDURE — G8420 CALC BMI NORM PARAMETERS: HCPCS | Performed by: NURSE PRACTITIONER

## 2023-08-16 ASSESSMENT — SLEEP AND FATIGUE QUESTIONNAIRES
HOW LIKELY ARE YOU TO NOD OFF OR FALL ASLEEP WHILE SITTING INACTIVE IN A PUBLIC PLACE: 1
HOW LIKELY ARE YOU TO NOD OFF OR FALL ASLEEP WHEN YOU ARE A PASSENGER IN A CAR FOR AN HOUR WITHOUT A BREAK: 3
HOW LIKELY ARE YOU TO NOD OFF OR FALL ASLEEP WHILE SITTING AND TALKING TO SOMEONE: 0
NECK CIRCUMFERENCE (INCHES): 14
HOW LIKELY ARE YOU TO NOD OFF OR FALL ASLEEP WHILE SITTING AND READING: 3
HOW LIKELY ARE YOU TO NOD OFF OR FALL ASLEEP WHILE LYING DOWN TO REST IN THE AFTERNOON WHEN CIRCUMSTANCES PERMIT: 3
HOW LIKELY ARE YOU TO NOD OFF OR FALL ASLEEP WHILE WATCHING TV: 2
ESS TOTAL SCORE: 14
HOW LIKELY ARE YOU TO NOD OFF OR FALL ASLEEP WHILE SITTING QUIETLY AFTER LUNCH WITHOUT ALCOHOL: 2
HOW LIKELY ARE YOU TO NOD OFF OR FALL ASLEEP IN A CAR, WHILE STOPPED FOR A FEW MINUTES IN TRAFFIC: 0

## 2023-08-16 NOTE — PROGRESS NOTES
MA Communication: The following orders are received by verbal communication from Aj Loja, [address]    Orders include:  [id number] will call to schedule.     Dr. Maldonado Pryor will call you with the results
place, and time. Psychiatric:         Mood and Affect: Mood normal.         Behavior: Behavior normal.         Thought Content: Thought content normal.         Judgment: Judgment normal.        Assessment/Plan:     1. MOHAMUD (obstructive sleep apnea)  -     Home Sleep Study; Future  2. Fatigue, unspecified type  3. ANT positive  4. Hypersomnolence  -     Home Sleep Study; Future  5. Bruxism  -     Home Sleep Study; Future  6. Cataplexy  -     Home Sleep Study; Future            RECOMMENDATIONS:     Vesna Degroot will be scheduled for polysomnography in order to evaluate for the presence and severity of obstructive sleep apnea. She  was given a discussion of the pathophysiology, evaluation and treatment of apnea. Thyroid function tests are recommended if not done recently. If MOHAMUD is controlled and still having excessive daytime sleepiness will need evaluation for possible narcolepsy with cataplexy. Advised to avoid driving when too sleepy to function safely. Discussed the risks of untreated apnea such as accidents, cognitive impairment, mood impairment, high blood pressure, various cardiac diseases and stroke. Follow up with Rheumatology as discussed.      Dr. Alex Wall will call you with the results     LAUREN Calvillo - CNP

## 2023-09-05 ENCOUNTER — HOSPITAL ENCOUNTER (OUTPATIENT)
Dept: SLEEP CENTER | Age: 28
Discharge: HOME | End: 2023-09-07
Payer: COMMERCIAL

## 2023-09-05 DIAGNOSIS — G47.10 HYPERSOMNOLENCE: ICD-10-CM

## 2023-09-05 DIAGNOSIS — G47.33 OSA (OBSTRUCTIVE SLEEP APNEA): ICD-10-CM

## 2023-09-05 DIAGNOSIS — G47.411 CATAPLEXY: ICD-10-CM

## 2023-09-05 DIAGNOSIS — F45.8 BRUXISM: ICD-10-CM

## 2023-09-05 PROCEDURE — 95806 SLEEP STUDY UNATT&RESP EFFT: CPT

## 2023-09-08 ENCOUNTER — TELEPHONE (OUTPATIENT)
Dept: PULMONOLOGY | Age: 28
End: 2023-09-08

## 2023-09-08 PROBLEM — G47.33 OSA (OBSTRUCTIVE SLEEP APNEA): Status: ACTIVE | Noted: 2023-09-08

## 2023-09-08 NOTE — TELEPHONE ENCOUNTER
Faxed order to [facility]
____  Rise time _____  Trig _____  Ti ______     Additional Services    [] Annual PRN service and check of equipment  [] Routine service and check of equipment  [] Download and report compliance data                                      The following equipment is Medically Necessary for the above stated patient. It is reasonable and necessary in reference to acceptable standards of medical practice for this condition, and is not prescribed as a convenience. Frequency of Use:    Daily                 Length of Need: 13 Months              o The patient requires BiLevel PAP and the following apply: []  The patient requires a Respiratory Assist Device (RAD) and the following apply:   o CPAP was tried and failed to meet therapeutic goals. [] CPAP was tried, but failed to meet therapeutic goals   o The prescribed mask interface has been properly fit, is the most comfortable to the patient and will be used with the BPAP device. [] The prescribed mask interface has been properly fit, is the most comfortable to the patient and will be used with the RAD.   o Current CPAP setting prevents patient from tolerating the therapy and lower CPAP settings fail to adequately control the symptoms of MOHAMUD, improve sleep quality, or reduce AHI to acceptable levels. [] Current CPAP setting prevent patient from tolerating the therapy and lower PAP settings fail to  adequately control MOHAMUD symptoms, improve sleep quality, or reduce AHI to acceptable levels.          [] There is significant improvement of the respiratory events on the RAD                                                                                                                                                                                                                                  Rachid Alva MD NPI- [id number]     [facility]- [phone]                    09/08/23   Electronically signed    ____________________________

## 2023-11-01 ENCOUNTER — OFFICE VISIT (OUTPATIENT)
Dept: OBGYN CLINIC | Age: 28
End: 2023-11-01
Payer: COMMERCIAL

## 2023-11-01 VITALS
WEIGHT: 164.6 LBS | OXYGEN SATURATION: 100 % | BODY MASS INDEX: 25.03 KG/M2 | HEART RATE: 90 BPM | DIASTOLIC BLOOD PRESSURE: 74 MMHG | SYSTOLIC BLOOD PRESSURE: 110 MMHG | TEMPERATURE: 98.6 F

## 2023-11-01 DIAGNOSIS — Z79.3 ON ORAL CONTRACEPTIVE PILLS FOR NON-CONTRACEPTION INDICATION: ICD-10-CM

## 2023-11-01 DIAGNOSIS — Z01.419 WOMEN'S ANNUAL ROUTINE GYNECOLOGICAL EXAMINATION: Primary | ICD-10-CM

## 2023-11-01 DIAGNOSIS — Z53.8 PAP SMEAR OF CERVIX NOT NEEDED: ICD-10-CM

## 2023-11-01 DIAGNOSIS — Z87.42 HISTORY OF OVARIAN CYST: ICD-10-CM

## 2023-11-01 PROCEDURE — 99395 PREV VISIT EST AGE 18-39: CPT | Performed by: OBSTETRICS & GYNECOLOGY

## 2023-11-01 PROCEDURE — G8484 FLU IMMUNIZE NO ADMIN: HCPCS | Performed by: OBSTETRICS & GYNECOLOGY

## 2023-11-01 RX ORDER — IBUPROFEN 600 MG/1
600 TABLET ORAL EVERY 6 HOURS PRN
Qty: 40 TABLET | Refills: 1 | Status: SHIPPED | OUTPATIENT
Start: 2023-11-01

## 2023-11-01 RX ORDER — TIZANIDINE 2 MG/1
TABLET ORAL
COMMUNITY
Start: 2023-10-20

## 2023-11-01 RX ORDER — LEVONORGESTREL / ETHINYL ESTRADIOL AND ETHINYL ESTRADIOL 150-30(84)
KIT ORAL
Qty: 91 TABLET | Refills: 1 | Status: SHIPPED | OUTPATIENT
Start: 2023-11-01

## 2023-11-01 ASSESSMENT — ENCOUNTER SYMPTOMS
ABDOMINAL PAIN: 0
VOMITING: 0
SHORTNESS OF BREATH: 0
DIARRHEA: 0
CONSTIPATION: 0
ABDOMINAL DISTENTION: 0
NAUSEA: 0

## 2023-11-01 NOTE — PROGRESS NOTES
Subjective:      Patient ID: Jamila eLach is a 33 y.o. female. HPI  32 y/o G0 female presents for well woman examination. Last pap smear was 8/12/2020--normal (no endocervical cells). No history of abnormal pap smear. Menses occur every 3 months x 4-5 days, normal flow, + cramping. Has had chronic issues with right lower quadrant pain--felt to be secondary to ovarian cysts--pain has improved over time--less often than in the past--no current issues. Ultrasound on 8/12/2020 indicated normal findings. Never sexually active. Review of Systems   Constitutional:  Negative for activity change, appetite change, chills, fatigue, fever and unexpected weight change. Respiratory:  Negative for shortness of breath. Cardiovascular:  Negative for chest pain. Gastrointestinal:  Negative for abdominal distention, abdominal pain, constipation, diarrhea, nausea and vomiting. Endocrine: Negative for cold intolerance and heat intolerance. Genitourinary:  Negative for difficulty urinating, dyspareunia, dysuria, frequency, genital sores, hematuria, menstrual problem, pelvic pain, vaginal bleeding, vaginal discharge and vaginal pain. Skin:  Negative for rash. Neurological:  Negative for headaches. Hematological:  Does not bruise/bleed easily. Objective:   Physical Exam  Vitals and nursing note reviewed. Constitutional:       General: She is not in acute distress. Appearance: Normal appearance. She is well-developed. She is not ill-appearing or toxic-appearing. HENT:      Head: Normocephalic and atraumatic. Neck:      Thyroid: No thyromegaly. Trachea: Trachea normal.   Cardiovascular:      Rate and Rhythm: Normal rate and regular rhythm. Heart sounds: Normal heart sounds, S1 normal and S2 normal.   Pulmonary:      Effort: Pulmonary effort is normal. No respiratory distress. Breath sounds: Normal breath sounds.    Chest:   Breasts:     Breasts are symmetrical.      Right: No

## 2023-11-30 ENCOUNTER — OFFICE VISIT (OUTPATIENT)
Dept: PULMONOLOGY | Age: 28
End: 2023-11-30
Payer: COMMERCIAL

## 2023-11-30 VITALS
WEIGHT: 164.6 LBS | OXYGEN SATURATION: 100 % | RESPIRATION RATE: 16 BRPM | BODY MASS INDEX: 24.95 KG/M2 | HEART RATE: 68 BPM | TEMPERATURE: 98.5 F | HEIGHT: 68 IN | SYSTOLIC BLOOD PRESSURE: 129 MMHG | DIASTOLIC BLOOD PRESSURE: 77 MMHG

## 2023-11-30 DIAGNOSIS — G47.10 HYPERSOMNOLENCE: ICD-10-CM

## 2023-11-30 DIAGNOSIS — G47.33 OSA (OBSTRUCTIVE SLEEP APNEA): Primary | ICD-10-CM

## 2023-11-30 PROCEDURE — G8484 FLU IMMUNIZE NO ADMIN: HCPCS | Performed by: NURSE PRACTITIONER

## 2023-11-30 PROCEDURE — 99213 OFFICE O/P EST LOW 20 MIN: CPT | Performed by: NURSE PRACTITIONER

## 2023-11-30 PROCEDURE — G8419 CALC BMI OUT NRM PARAM NOF/U: HCPCS | Performed by: NURSE PRACTITIONER

## 2023-11-30 PROCEDURE — 1036F TOBACCO NON-USER: CPT | Performed by: NURSE PRACTITIONER

## 2023-11-30 PROCEDURE — G8427 DOCREV CUR MEDS BY ELIG CLIN: HCPCS | Performed by: NURSE PRACTITIONER

## 2023-11-30 ASSESSMENT — SLEEP AND FATIGUE QUESTIONNAIRES
ESS TOTAL SCORE: 13
HOW LIKELY ARE YOU TO NOD OFF OR FALL ASLEEP IN A CAR, WHILE STOPPED FOR A FEW MINUTES IN TRAFFIC: 0
HOW LIKELY ARE YOU TO NOD OFF OR FALL ASLEEP WHILE SITTING AND TALKING TO SOMEONE: 0
HOW LIKELY ARE YOU TO NOD OFF OR FALL ASLEEP WHEN YOU ARE A PASSENGER IN A CAR FOR AN HOUR WITHOUT A BREAK: 3
HOW LIKELY ARE YOU TO NOD OFF OR FALL ASLEEP WHILE SITTING INACTIVE IN A PUBLIC PLACE: 1
HOW LIKELY ARE YOU TO NOD OFF OR FALL ASLEEP WHILE LYING DOWN TO REST IN THE AFTERNOON WHEN CIRCUMSTANCES PERMIT: 3
HOW LIKELY ARE YOU TO NOD OFF OR FALL ASLEEP WHILE WATCHING TV: 3
HOW LIKELY ARE YOU TO NOD OFF OR FALL ASLEEP WHILE SITTING AND READING: 2
HOW LIKELY ARE YOU TO NOD OFF OR FALL ASLEEP WHILE SITTING QUIETLY AFTER LUNCH WITHOUT ALCOHOL: 1

## 2023-11-30 ASSESSMENT — ENCOUNTER SYMPTOMS
SORE THROAT: 0
RHINORRHEA: 0
COUGH: 0
SHORTNESS OF BREATH: 0
ABDOMINAL PAIN: 0
WHEEZING: 0
CHEST TIGHTNESS: 0
EYE PAIN: 0

## 2023-11-30 NOTE — PATIENT INSTRUCTIONS
Adrián Braun has good benefit and adherence on PAP therapy. Compliance report information was analyzed to assess complexity and medical decision making in regards to further testing and management. Will prescribe home medical equipment company to check pressures, download usage and will replace mask, tubing, disposables and filters as needed. Instructed to wash or wipe face of excess oil before using CPAP to prevent the mask / nasal pillow from sliding and ensure proper fit. Empty the water daily and allow the chamber and tubings to air dry. Instructed how to properly clean the device to prevent bacteria and mold growth in the water chamber. Advised to avoid driving if too sleepy to function safely and given a discussion of the risks of untreated apneas such as accidents, cognitive impairment, mood impairment, worsening high blood pressure, various cardiac disease and stroke. Follow up September or sooner for any issues.

## 2023-11-30 NOTE — PROGRESS NOTES
MA Communication:  The following orders are received by verbal communication from CHAVA Peñaloza    Follow up scheduled for 09/26/2024 8:30 am
medical attention. 16-24: You are excessively sleepy and should consider seeking medical attention      PAP Compliance report reviewed dates 10/29/23-11/27/23:    PAP data -set on Min 5, max 20 cm H20  Days with usage 30 days   Days without device usage 0  Percent days with Device Usage 100 %   Percent of days with Usage greater than  4 hours 100 %   Average usage days used 7 hours 43 minutes     AHI 1.8      Current Outpatient Medications   Medication Sig Dispense Refill    tiZANidine (ZANAFLEX) 2 MG tablet       Levonorgest-Eth Estrad 91-Day (DAYSEE) 0.15-0.03 &0.01 MG TABS TAKE ONE TABLET BY MOUTH DAILY 91 tablet 1    ibuprofen (ADVIL;MOTRIN) 600 MG tablet Take 1 tablet by mouth every 6 hours as needed for Pain 40 tablet 1    vitamin B-12 (CYANOCOBALAMIN) 1000 MCG tablet Take 1 tablet by mouth daily 30 tablet 3     No current facility-administered medications for this visit. No Known Allergies     Past Medical History:   Diagnosis Date    Megaloblastic anemia due to vitamin B12 deficiency 8/16/2023    Ovarian cyst, right     Seasonal allergies         Past Surgical History:   Procedure Laterality Date    EPIDURAL STEROID INJECTION Right 1/21/2019    RIGHT LUMBAR FIVE SACRAL ONE EPIDURAL STEROID INJECTION SITE CONFIRMED BY FLUOROSCOPY performed by Caleb Garcia MD at [facility]        Review of Systems   Constitutional:  Negative for chills, fatigue and fever. HENT:  Negative for congestion, postnasal drip, rhinorrhea and sore throat. Eyes:  Negative for pain and visual disturbance. Wears glasses   Respiratory:  Negative for cough, chest tightness, shortness of breath and wheezing. Cardiovascular:  Negative for chest pain, palpitations and leg swelling. Gastrointestinal:  Negative for abdominal pain. Genitourinary:  Negative for difficulty urinating. Musculoskeletal: Negative. Skin: Negative. Neurological:  Positive for numbness (Marlon hands, chronic, varies).

## 2024-09-26 ENCOUNTER — OFFICE VISIT (OUTPATIENT)
Dept: PULMONOLOGY | Age: 29
End: 2024-09-26

## 2024-09-26 VITALS
BODY MASS INDEX: 26.37 KG/M2 | HEART RATE: 59 BPM | HEIGHT: 68 IN | OXYGEN SATURATION: 98 % | DIASTOLIC BLOOD PRESSURE: 77 MMHG | WEIGHT: 174 LBS | SYSTOLIC BLOOD PRESSURE: 117 MMHG | RESPIRATION RATE: 16 BRPM | TEMPERATURE: 98.2 F

## 2024-09-26 DIAGNOSIS — G47.33 OSA (OBSTRUCTIVE SLEEP APNEA): Primary | ICD-10-CM

## 2024-09-26 DIAGNOSIS — E66.3 OVERWEIGHT (BMI 25.0-29.9): ICD-10-CM

## 2024-09-26 ASSESSMENT — SLEEP AND FATIGUE QUESTIONNAIRES
HOW LIKELY ARE YOU TO NOD OFF OR FALL ASLEEP WHILE SITTING AND TALKING TO SOMEONE: WOULD NEVER DOZE
HOW LIKELY ARE YOU TO NOD OFF OR FALL ASLEEP WHILE SITTING AND READING: SLIGHT CHANCE OF DOZING
ESS TOTAL SCORE: 12
HOW LIKELY ARE YOU TO NOD OFF OR FALL ASLEEP WHEN YOU ARE A PASSENGER IN A CAR FOR AN HOUR WITHOUT A BREAK: HIGH CHANCE OF DOZING
HOW LIKELY ARE YOU TO NOD OFF OR FALL ASLEEP WHILE WATCHING TV: HIGH CHANCE OF DOZING
HOW LIKELY ARE YOU TO NOD OFF OR FALL ASLEEP WHILE SITTING INACTIVE IN A PUBLIC PLACE: SLIGHT CHANCE OF DOZING
HOW LIKELY ARE YOU TO NOD OFF OR FALL ASLEEP WHILE LYING DOWN TO REST IN THE AFTERNOON WHEN CIRCUMSTANCES PERMIT: HIGH CHANCE OF DOZING
HOW LIKELY ARE YOU TO NOD OFF OR FALL ASLEEP IN A CAR, WHILE STOPPED FOR A FEW MINUTES IN TRAFFIC: WOULD NEVER DOZE
HOW LIKELY ARE YOU TO NOD OFF OR FALL ASLEEP WHILE SITTING QUIETLY AFTER LUNCH WITHOUT ALCOHOL: SLIGHT CHANCE OF DOZING

## 2024-09-26 ASSESSMENT — ENCOUNTER SYMPTOMS
EYE PAIN: 0
ABDOMINAL PAIN: 0
RHINORRHEA: 0
COUGH: 0
CHEST TIGHTNESS: 0
SORE THROAT: 0
WHEEZING: 0
SHORTNESS OF BREATH: 0

## (undated) DEVICE — TOWEL OR BLUEE 16X26IN ST 8 PACK ORB08 16X26ORTWL

## (undated) DEVICE — GAUZE,SPONGE,4"X4",16PLY,STRL,LF,10/TRAY: Brand: MEDLINE

## (undated) DEVICE — STERILE POLYISOPRENE POWDER-FREE SURGICAL GLOVES: Brand: PROTEXIS

## (undated) DEVICE — UNIVERSAL BLOCK TRAY: Brand: MEDLINE INDUSTRIES, INC.

## (undated) DEVICE — ALCOHOL RUBBING 16OZ 70% ISO

## (undated) DEVICE — CHLORAPREP 26ML ORANGE